# Patient Record
Sex: FEMALE | Race: OTHER | NOT HISPANIC OR LATINO
[De-identification: names, ages, dates, MRNs, and addresses within clinical notes are randomized per-mention and may not be internally consistent; named-entity substitution may affect disease eponyms.]

---

## 2019-10-23 PROBLEM — Z00.00 ENCOUNTER FOR PREVENTIVE HEALTH EXAMINATION: Status: ACTIVE | Noted: 2019-10-23

## 2019-11-04 ENCOUNTER — APPOINTMENT (OUTPATIENT)
Dept: VASCULAR SURGERY | Facility: CLINIC | Age: 74
End: 2019-11-04
Payer: MEDICARE

## 2019-11-04 VITALS
OXYGEN SATURATION: 99 % | BODY MASS INDEX: 34.93 KG/M2 | HEIGHT: 61 IN | SYSTOLIC BLOOD PRESSURE: 152 MMHG | HEART RATE: 66 BPM | DIASTOLIC BLOOD PRESSURE: 79 MMHG | WEIGHT: 185 LBS

## 2019-11-04 DIAGNOSIS — Z78.9 OTHER SPECIFIED HEALTH STATUS: ICD-10-CM

## 2019-11-04 DIAGNOSIS — Z86.79 PERSONAL HISTORY OF OTHER DISEASES OF THE CIRCULATORY SYSTEM: ICD-10-CM

## 2019-11-04 DIAGNOSIS — L97.329 NON-PRESSURE CHRONIC ULCER OF LEFT ANKLE WITH UNSPECIFIED SEVERITY: ICD-10-CM

## 2019-11-04 DIAGNOSIS — Z86.39 PERSONAL HISTORY OF OTHER ENDOCRINE, NUTRITIONAL AND METABOLIC DISEASE: ICD-10-CM

## 2019-11-04 PROCEDURE — 29580 STRAPPING UNNA BOOT: CPT | Mod: LT

## 2019-11-04 PROCEDURE — 93971 EXTREMITY STUDY: CPT

## 2019-11-04 PROCEDURE — 99203 OFFICE O/P NEW LOW 30 MIN: CPT | Mod: 25

## 2019-11-06 NOTE — PHYSICAL EXAM
[2+] : left 2+ [1+] : left 1+ [Ankle Swelling (On Exam)] : present [Ankle Swelling On The Left] : of the left ankle [Ankle Swelling On The Right] : mild [] : of the left leg [Alert] : alert [Calm] : calm [Varicose Veins Of Lower Extremities] : not present [Abdomen Tenderness] : ~T ~M No abdominal tenderness [de-identified] : WN/WD, NAD [de-identified] : NC/AT [de-identified] : supple [de-identified] : +FROM [de-identified] : L medial malleolus: + hyperpigmentation, + small dry ulcerations, no signs of infection, no drainage

## 2019-11-06 NOTE — REVIEW OF SYSTEMS
[As Noted in HPI] : as noted in HPI [Skin Lesions] : skin lesion [Negative] : Heme/Lymph [Fever] : no fever [Chills] : no chills [Leg Claudication] : no intermittent leg claudication

## 2019-11-06 NOTE — ASSESSMENT
[Ulcer Care] : ulcer care [FreeTextEntry1] : 73 yo F comes for initial evaluation of left ankle ulcer that hasn't been healing for 3 years.\par Left medial malleolus venous stasis ulcer with no signs of infection.\par Venous doppler was done in the office demonstrated small reflux in SSV.\par Patient was recommended to have unna boot.\par Unna boot was strapped.\par Pt will return next week for wound check and unna boot change.

## 2019-11-06 NOTE — HISTORY OF PRESENT ILLNESS
[FreeTextEntry1] : 75 yo F comes for initial evaluation of left ankle ulcer that hasn't been healing for 3 years. Patient has been following up at [facility], where she was recommended to apply topical ointment. She denies claudication, hx of DVT, varicose veins. She denies drainage from the ulcer, fever, chills.

## 2019-11-11 ENCOUNTER — APPOINTMENT (OUTPATIENT)
Dept: VASCULAR SURGERY | Facility: CLINIC | Age: 74
End: 2019-11-11
Payer: MEDICARE

## 2019-11-11 PROCEDURE — 99213 OFFICE O/P EST LOW 20 MIN: CPT | Mod: 25

## 2019-11-11 PROCEDURE — 29580 STRAPPING UNNA BOOT: CPT | Mod: LT

## 2019-11-11 NOTE — HISTORY OF PRESENT ILLNESS
[FreeTextEntry1] : 75 yo F with LLE venous stasis ulcer who was seen for the first time last week and had unna boot strapped over her leg returns for a f/u. Patient denies pain, states that her swelling is much less and her ulcer is healing nicely. No fever, chills.

## 2019-11-11 NOTE — REVIEW OF SYSTEMS
[Lower Ext Edema] : lower extremity edema [Limb Swelling] : limb swelling [As Noted in HPI] : as noted in HPI [Skin Lesions] : skin lesion [Negative] : Heme/Lymph [Fever] : no fever [Leg Claudication] : no intermittent leg claudication [Limb Pain] : no limb pain [Chills] : no chills

## 2019-11-11 NOTE — PHYSICAL EXAM
[2+] : left 2+ [1+] : left 1+ [Ankle Swelling (On Exam)] : present [Ankle Swelling On The Right] : mild [Ankle Swelling On The Left] : of the left ankle [] : of the left leg [Alert] : alert [Calm] : calm [Varicose Veins Of Lower Extremities] : not present [Abdomen Tenderness] : ~T ~M No abdominal tenderness [de-identified] : WN/WD, NAD [de-identified] : NC/AT [de-identified] : supple [de-identified] : +FROM [de-identified] : L medial malleolus: + hyperpigmentation, + small dry ulceration, no signs of infection, no drainage

## 2019-11-11 NOTE — ASSESSMENT
[Ulcer Care] : ulcer care [FreeTextEntry1] : 75 yo F with LLE venous stasis ulcer who was seen for the first time last week and had unna boot strapped over her leg returns for a f/u. \par Ulcer is almost healed.\par Unna boot was strapped.\par Patient was given a referral to see a podiatrist for pain in her toe due to ingrown nail.\par F/u prn.

## 2020-02-10 ENCOUNTER — APPOINTMENT (OUTPATIENT)
Dept: VASCULAR SURGERY | Facility: CLINIC | Age: 75
End: 2020-02-10

## 2020-02-24 ENCOUNTER — APPOINTMENT (OUTPATIENT)
Dept: VASCULAR SURGERY | Facility: CLINIC | Age: 75
End: 2020-02-24
Payer: MEDICARE

## 2020-02-24 PROCEDURE — 29580 STRAPPING UNNA BOOT: CPT | Mod: LT

## 2020-02-24 PROCEDURE — 99212 OFFICE O/P EST SF 10 MIN: CPT | Mod: 25

## 2020-02-24 RX ORDER — FLUOCINONIDE 0.5 MG/G
0.05 CREAM TOPICAL TWICE DAILY
Qty: 30 | Refills: 0 | Status: ACTIVE | COMMUNITY
Start: 2020-02-24 | End: 1900-01-01

## 2020-02-25 NOTE — PHYSICAL EXAM
[2+] : right 2+ [1+] : left 1+ [Ankle Swelling (On Exam)] : present [Ankle Swelling On The Left] : of the left ankle [Ankle Swelling On The Right] : mild [] : of the left leg [Alert] : alert [Calm] : calm [Varicose Veins Of Lower Extremities] : not present [Abdomen Tenderness] : ~T ~M No abdominal tenderness [de-identified] : NC/AT [de-identified] : WN/WD, NAD [de-identified] : supple [de-identified] : +FROM [de-identified] : L medial malleolus: + hyperpigmentation, + small dry ulceration, no signs of infection, no drainage

## 2020-02-25 NOTE — HISTORY OF PRESENT ILLNESS
[FreeTextEntry1] : 75 y/o F with PMHx of HLD, HTN presents today for a follow-up on LLE venous stasis ulcer. Patient denies pain, states that her swelling is much less and her ulcer is healing nicely. No fever, chills.

## 2020-02-25 NOTE — ASSESSMENT
[Ulcer Care] : ulcer care [FreeTextEntry1] : 73 y/o F with LLE venous stasis ulcer. The ulcer is healing well\par

## 2020-02-25 NOTE — REVIEW OF SYSTEMS
[Lower Ext Edema] : lower extremity edema [Limb Swelling] : limb swelling [As Noted in HPI] : as noted in HPI [Skin Lesions] : skin lesion [Negative] : Heme/Lymph [Chills] : no chills [Fever] : no fever [Leg Claudication] : no intermittent leg claudication [Limb Pain] : no limb pain

## 2020-07-06 ENCOUNTER — APPOINTMENT (OUTPATIENT)
Dept: VASCULAR SURGERY | Facility: CLINIC | Age: 75
End: 2020-07-06

## 2020-09-07 ENCOUNTER — EMERGENCY (EMERGENCY)
Facility: HOSPITAL | Age: 75
LOS: 1 days | Discharge: HOME | End: 2020-09-07
Attending: EMERGENCY MEDICINE | Admitting: EMERGENCY MEDICINE
Payer: MEDICARE

## 2020-09-07 VITALS
HEART RATE: 88 BPM | HEIGHT: 64 IN | TEMPERATURE: 98 F | DIASTOLIC BLOOD PRESSURE: 63 MMHG | SYSTOLIC BLOOD PRESSURE: 140 MMHG | RESPIRATION RATE: 18 BRPM | WEIGHT: 175.05 LBS

## 2020-09-07 LAB
ALBUMIN SERPL ELPH-MCNC: 4.1 G/DL — SIGNIFICANT CHANGE UP (ref 3.3–5)
ALP SERPL-CCNC: 77 U/L — SIGNIFICANT CHANGE UP (ref 40–120)
ALT FLD-CCNC: 8 U/L — LOW (ref 10–45)
ANION GAP SERPL CALC-SCNC: 13 MMOL/L — SIGNIFICANT CHANGE UP (ref 5–17)
APTT BLD: 33.4 SEC — SIGNIFICANT CHANGE UP (ref 27.5–35.5)
AST SERPL-CCNC: 12 U/L — SIGNIFICANT CHANGE UP (ref 10–40)
BASOPHILS # BLD AUTO: 0.01 K/UL — SIGNIFICANT CHANGE UP (ref 0–0.2)
BASOPHILS NFR BLD AUTO: 0.1 % — SIGNIFICANT CHANGE UP (ref 0–2)
BILIRUB SERPL-MCNC: 0.3 MG/DL — SIGNIFICANT CHANGE UP (ref 0.2–1.2)
BUN SERPL-MCNC: 23 MG/DL — SIGNIFICANT CHANGE UP (ref 7–23)
CALCIUM SERPL-MCNC: 9.9 MG/DL — SIGNIFICANT CHANGE UP (ref 8.4–10.5)
CHLORIDE SERPL-SCNC: 102 MMOL/L — SIGNIFICANT CHANGE UP (ref 96–108)
CO2 SERPL-SCNC: 25 MMOL/L — SIGNIFICANT CHANGE UP (ref 22–31)
CREAT SERPL-MCNC: 0.63 MG/DL — SIGNIFICANT CHANGE UP (ref 0.5–1.3)
EOSINOPHIL # BLD AUTO: 0.05 K/UL — SIGNIFICANT CHANGE UP (ref 0–0.5)
EOSINOPHIL NFR BLD AUTO: 0.6 % — SIGNIFICANT CHANGE UP (ref 0–6)
GLUCOSE SERPL-MCNC: 138 MG/DL — HIGH (ref 70–99)
HCT VFR BLD CALC: 36.9 % — SIGNIFICANT CHANGE UP (ref 34.5–45)
HGB BLD-MCNC: 12 G/DL — SIGNIFICANT CHANGE UP (ref 11.5–15.5)
IMM GRANULOCYTES NFR BLD AUTO: 0.4 % — SIGNIFICANT CHANGE UP (ref 0–1.5)
INR BLD: 1.23 — HIGH (ref 0.88–1.16)
LYMPHOCYTES # BLD AUTO: 1.71 K/UL — SIGNIFICANT CHANGE UP (ref 1–3.3)
LYMPHOCYTES # BLD AUTO: 22 % — SIGNIFICANT CHANGE UP (ref 13–44)
MCHC RBC-ENTMCNC: 30.1 PG — SIGNIFICANT CHANGE UP (ref 27–34)
MCHC RBC-ENTMCNC: 32.5 GM/DL — SIGNIFICANT CHANGE UP (ref 32–36)
MCV RBC AUTO: 92.5 FL — SIGNIFICANT CHANGE UP (ref 80–100)
MONOCYTES # BLD AUTO: 0.63 K/UL — SIGNIFICANT CHANGE UP (ref 0–0.9)
MONOCYTES NFR BLD AUTO: 8.1 % — SIGNIFICANT CHANGE UP (ref 2–14)
NEUTROPHILS # BLD AUTO: 5.35 K/UL — SIGNIFICANT CHANGE UP (ref 1.8–7.4)
NEUTROPHILS NFR BLD AUTO: 68.8 % — SIGNIFICANT CHANGE UP (ref 43–77)
NRBC # BLD: 0 /100 WBCS — SIGNIFICANT CHANGE UP (ref 0–0)
PLATELET # BLD AUTO: 218 K/UL — SIGNIFICANT CHANGE UP (ref 150–400)
POTASSIUM SERPL-MCNC: 4 MMOL/L — SIGNIFICANT CHANGE UP (ref 3.5–5.3)
POTASSIUM SERPL-SCNC: 4 MMOL/L — SIGNIFICANT CHANGE UP (ref 3.5–5.3)
PROT SERPL-MCNC: 7.4 G/DL — SIGNIFICANT CHANGE UP (ref 6–8.3)
PROTHROM AB SERPL-ACNC: 14.6 SEC — HIGH (ref 10.6–13.6)
RBC # BLD: 3.99 M/UL — SIGNIFICANT CHANGE UP (ref 3.8–5.2)
RBC # FLD: 13.4 % — SIGNIFICANT CHANGE UP (ref 10.3–14.5)
SODIUM SERPL-SCNC: 140 MMOL/L — SIGNIFICANT CHANGE UP (ref 135–145)
WBC # BLD: 7.78 K/UL — SIGNIFICANT CHANGE UP (ref 3.8–10.5)
WBC # FLD AUTO: 7.78 K/UL — SIGNIFICANT CHANGE UP (ref 3.8–10.5)

## 2020-09-07 PROCEDURE — 85730 THROMBOPLASTIN TIME PARTIAL: CPT

## 2020-09-07 PROCEDURE — 99283 EMERGENCY DEPT VISIT LOW MDM: CPT

## 2020-09-07 PROCEDURE — 36415 COLL VENOUS BLD VENIPUNCTURE: CPT

## 2020-09-07 PROCEDURE — 85610 PROTHROMBIN TIME: CPT

## 2020-09-07 PROCEDURE — 85025 COMPLETE CBC W/AUTO DIFF WBC: CPT

## 2020-09-07 PROCEDURE — 80053 COMPREHEN METABOLIC PANEL: CPT

## 2020-09-07 PROCEDURE — 99284 EMERGENCY DEPT VISIT MOD MDM: CPT

## 2020-09-07 RX ORDER — IBUPROFEN 200 MG
600 TABLET ORAL ONCE
Refills: 0 | Status: COMPLETED | OUTPATIENT
Start: 2020-09-07 | End: 2020-09-07

## 2020-09-07 RX ORDER — ACETAMINOPHEN 500 MG
1000 TABLET ORAL ONCE
Refills: 0 | Status: COMPLETED | OUTPATIENT
Start: 2020-09-07 | End: 2020-09-07

## 2020-09-07 RX ADMIN — Medication 600 MILLIGRAM(S): at 16:44

## 2020-09-07 RX ADMIN — Medication 600 MILLIGRAM(S): at 17:30

## 2020-09-07 NOTE — ED PROVIDER NOTE - CARE PROVIDERS DIRECT ADDRESSES
[email or web address]

## 2020-09-07 NOTE — ED PROVIDER NOTE - OBJECTIVE STATEMENT
The pt is a 76 y/o F, who presents to ED c/o ulcer to L ankle x 4-5 yrs. Sees vascular at [facility], gets hany boots, just had one taken off -- here wanting to know why the ulcer hasn't fully healed, admits that ulcer heals but then comes back, no acute or worsening symptoms, no increase in size of ulcer. Reports "pulling" sensation around ulcer site. Denies fevers, chills, calf pain, injury, decreased rom, pus, bleeding

## 2020-09-07 NOTE — ED PROVIDER NOTE - PATIENT PORTAL LINK FT
You can access the FollowMyHealth Patient Portal offered by [facility] by registering at the following website: [email or web address]. By joining [employer]’s FollowMyHealth portal, you will also be able to view your health information using other applications (apps) compatible with our system.

## 2020-09-07 NOTE — ED PROVIDER NOTE - MUSCULOSKELETAL, MLM
Spine appears normal, range of motion is not limited, no muscle or joint tenderness; LE: + venous stasis changes b/l - L>R, + 5mm clean ulcer to l medial malleolus w/clear dc, no pus, no bleeding, no warmth, no tend, no ascending lymphangitis, pedal pulses 2+, no calf tend b/l, soft compartments b/l, + light touch b/l

## 2020-09-07 NOTE — ED ADULT NURSE NOTE - OBJECTIVE STATEMENT
Pt came to ED complaining of left lower leg extremity pain. Pt presents with diabetic leg ulcer and discoloration on LLE. Pt has approx 1.5cm x 1cm ulcer and 10cm x 8cm area of discoloration to left medial lower leg. Pt denies fever, chills, D/N/V, SOB, chest pain, /GI symptoms. Pt states she is under care of MD for wound care but states pain has increased.

## 2020-09-07 NOTE — ED PROVIDER NOTE - CARE PROVIDER_API CALL
Jill Adams)  [facility] Surgery  Vascular  [address] [place] [address]  Phone: [phone]  Fax: [phone]  Follow Up Time:     Gary Frye  SURGERY  [address] [place] [address]  Phone: [phone]  Fax: [phone]  Follow Up Time:

## 2020-09-07 NOTE — ED PROVIDER NOTE - ATTENDING CONTRIBUTION TO CARE
75F pmh venous stasis ulcer to L ankle for years, seen at [facility] specialist, w/ hany boot, seen recently at their clinic but came here today because not fully resolved. No f/c. Exam nontoxic, chronic appearing medial venous stasis ulcer, not acute/severely infected , no discharge/foul smell. Doubt sepsis, cellulitis, not consistent w/ nec fasc

## 2020-09-07 NOTE — ED PROVIDER NOTE - NSFOLLOWUPINSTRUCTIONS_ED_ALL_ED_FT
follow up with vascular for evaluation and treatment of your chronic ulcer  Stasis Dermatitis  WHAT YOU NEED TO KNOW:  Stasis dermatitis is a condition that develops when blood pools in your lower legs. It is caused by poor blood flow back to your heart.  DISCHARGE INSTRUCTIONS:  Call 911 for any of the following:   You feel lightheaded, short of breath, and have chest pain.  You cough up blood.  Return to the emergency department if:   Your leg feels warm, tender, and painful. It may look swollen and red.  Contact your healthcare provider if:   You have a fever.  Your pain is not getting better, even with treatment.  You have new or worse open sores.  Your sores are draining pus.  Your movement is limited.  You have questions or concerns about your condition or care.  Medicines:   Medicines help improve blood flow from your legs to your heart and decrease swelling.  Take your medicine as directed. Contact your healthcare provider if you think your medicine is not helping or if you have side effects. Tell him of her if you are allergic to any medicine. Keep a list of the medicines, vitamins, and herbs you take. Include the amounts, and when and why you take them. Bring the list or the pill bottles to follow-up visits. Carry your medicine list with you in case of an emergency  Manage your symptoms:   Wear pressure stockings. These tight elastic stockings put pressure on your legs. This improves blood flow and prevents blood from collecting in your legs.Pressure Stockings   Elevate your legs above the level of your heart for 30 minutes, 4 times a day. This will help decrease swelling and pain. Prop your legs on pillows or blankets to keep them elevated comfortably.  Apply unscented lotions or creams to help keep your skin moist and decrease itching.  Do not scratch your legs. Your skin can break open if you scratch. This can lead to sores or an infection.  Lifestyle changes:   Maintain a healthy weight. Ask your healthcare provider how much you should weigh. Ask him to help you create a weight loss plan if you are overweight. This will help improve your blood flow.  Eat a variety of healthy foods. Healthy foods include fruits, vegetables, whole-grain breads, low-fat dairy products, beans, lean meats, and fish. You may need to eat foods that are low in salt to help decrease swelling in your legs.  Exercise regularly. Ask your healthcare provider about the best exercise plan for you. Exercise improves the blood flow in your legs.  Chronic Wounds  WHAT YOU NEED TO KNOW:  A chronic wound is a wound that does not heal completely in 6 weeks. A wound is an injury that causes a break in the skin. There may also be damage to nearby tissues. Examples of wounds that can become chronic are deep ulcers (open sores), large burns, and infected cuts.  DISCHARGE INSTRUCTIONS:  Contact your healthcare provider if:   You have a fever.   You have increased or new pain, swelling, redness, or bleeding in or around your wound.  You have pus or a foul odor coming from your wound.  Your skin itches or has a rash.  You have open sores, blisters, or changes in the color or temperature of your skin.   You have questions or concerns about your condition or care.   Medicines:   NSAIDs, such as ibuprofen, help decrease swelling, pain, and fever. This medicine is available with or without a doctor's order. NSAIDs can cause stomach bleeding or kidney problems in certain people. If you take blood thinner medicine, always ask if NSAIDs are safe for you. Always read the medicine label and follow directions. Do not give these medicines to children under 6 months of age without direction from your child's healthcare provider.  Acetaminophen decreases pain and fever. It is available without a doctor's order. Ask how much to take and how often to take it. Follow directions. Read the labels of all other medicines you are using to see if they also contain acetaminophen, or ask your doctor or pharmacist. Acetaminophen can cause liver damage if not taken correctly. Do not use more than 4 grams (4,000 milligrams) total of acetaminophen in one day.   Antibiotics may be given to prevent or treat an infection caused by bacteria.  Take your medicine as directed. Contact your healthcare provider if you think your medicine is not helping or if you have side effects. Tell him or her if you are allergic to any medicine. Keep a list of the medicines, vitamins, and herbs you take. Include the amounts, and when and why you take them. Bring the list or the pill bottles to follow-up visits. Carry your medicine list with you in case of an emergency.  Follow up with your healthcare provider as directed: You need to return to have your wound checked. You may also need to have the bandage changed. Write down your questions so you remember to ask them during your visits.  What you need to know about wound care:   Wash your hands before and after you take care of your wound.  Keep the bandage clean and dry. Do not stop using the bandage on your wound unless your healthcare provider says it is okay.  Clean the wound and change the dressing as often as directed by your healthcare provider.   Eat healthy foods and drink liquids as directed: Healthy foods give your body the nutrients it needs to heal your wound. Liquids prevent dehydration that can decrease the blood supply to your wound. Healthy foods include fruits, vegetables, grains (breads and cereals), dairy, and protein foods. Protein foods include meat, fish, nuts, and soy products. Protein, calories, vitamin C, and zinc help wounds heal. Ask for more information about the foods you should eat to improve healing.   Do not smoke: If you smoke, it is never too late to quit. Smoking delays wound healing. Smoking also increases your risk for infection after surgery. Ask your healthcare provider for information if you need help quitting.  Prevent pressure wounds: Pressure wounds can develop when blood flow to an area is blocked. For example, you sit or lie in the same position without moving and put pressure on your heels. You can prevent pressure wounds by doing any of the following:  Change your position every 15 minutes while you are sitting.   Change your position every 2 hours while you lie in bed.  Prop your legs on pillows to lift your heels while you are lying down.  Check your skin or have someone else check your skin daily. Check the areas that are common to pressure wounds, such as elbows, heels, and buttocks. Common early signs of pressure wounds are open sores, blisters, or changes in color or temperature.

## 2020-09-07 NOTE — ED PROVIDER NOTE - CLINICAL SUMMARY MEDICAL DECISION MAKING FREE TEXT BOX
pt w/venous stasis changes and chronic ulcer - being tx'd w/hany boots at another hosp, presents to [facility] wanting to know why ulcer keeps coming back and not healing full, does not appear infected - no acute changes in any symptoms, no fevers, chills, pus/bleeding, labs , advised to f/u w/vascular for hany boot and further care. wound dressed. pt w/venous stasis changes and chronic ulcer - being tx'd w/hany boots at another hosp, presents to [facility] wanting to know why ulcer keeps coming back and not healing full, does not appear infected - no acute changes in any symptoms, no fevers, chills, pus/bleeding, labs wnl, advised to f/u w/vascular for hany boot and further care. wound dressed.

## 2020-09-11 DIAGNOSIS — Z88.1 ALLERGY STATUS TO OTHER ANTIBIOTIC AGENTS STATUS: ICD-10-CM

## 2020-09-11 DIAGNOSIS — M79.673 PAIN IN UNSPECIFIED FOOT: ICD-10-CM

## 2020-09-11 DIAGNOSIS — I83.003 VARICOSE VEINS OF UNSPECIFIED LOWER EXTREMITY WITH ULCER OF ANKLE: ICD-10-CM

## 2020-09-11 DIAGNOSIS — I87.2 VENOUS INSUFFICIENCY (CHRONIC) (PERIPHERAL): ICD-10-CM

## 2020-09-11 DIAGNOSIS — Z88.8 ALLERGY STATUS TO OTHER DRUGS, MEDICAMENTS AND BIOLOGICAL SUBSTANCES: ICD-10-CM

## 2020-09-18 ENCOUNTER — APPOINTMENT (OUTPATIENT)
Dept: VASCULAR SURGERY | Facility: CLINIC | Age: 75
End: 2020-09-18
Payer: MEDICARE

## 2020-09-18 PROCEDURE — 29580 STRAPPING UNNA BOOT: CPT | Mod: LT

## 2020-09-18 PROCEDURE — 99213 OFFICE O/P EST LOW 20 MIN: CPT | Mod: 25

## 2020-09-18 PROCEDURE — 93971 EXTREMITY STUDY: CPT

## 2020-09-18 RX ORDER — SILVER SULFADIAZINE 10 MG/G
1 CREAM TOPICAL DAILY
Qty: 30 | Refills: 1 | Status: ACTIVE | COMMUNITY
Start: 2020-09-18 | End: 1900-01-01

## 2020-09-18 RX ORDER — HYDROCODONE BITARTRATE AND ACETAMINOPHEN 5; 325 MG/1; MG/1
5-325 TABLET ORAL
Qty: 20 | Refills: 0 | Status: ACTIVE | COMMUNITY
Start: 2020-09-18 | End: 1900-01-01

## 2020-09-18 NOTE — ASSESSMENT
[FreeTextEntry1] : 74 y/o F with LLE venous stasis ulcer. Upon examination with healing LLE ulcer with no active drainage, surrounding erythema or signs of infection. LLE doppler done at the office today demonstrates: Negative DVT, SSV reflux but small. LLE Wound cleaned with hydrogen peroxide, A&D ointment applied. Wrapped with Kerlix,  ACE bandage and Unna boot.Patient educated on wound care, will set up VNS at home. She will follow up with us in 4 weeks or sooner if necessary.

## 2020-09-18 NOTE — PROCEDURE
[FreeTextEntry1] : LLE doppler done at the office today demonstrates: Negative DVT, SSV reflux but small. \par LLE Wound cleaned with hydrogen peroxide, A&D ointment applied. Wrapped with Kerlix,  ACE bandage and Unna boot.

## 2020-09-18 NOTE — PHYSICAL EXAM
[2+] : left 2+ [1+] : left 1+ [Ankle Swelling (On Exam)] : present [Ankle Swelling On The Left] : of the left ankle [Ankle Swelling On The Right] : mild [] : of the left leg [Alert] : alert [Oriented to Person] : oriented to person [Oriented to Place] : oriented to place [Oriented to Time] : oriented to time [Calm] : calm [Varicose Veins Of Lower Extremities] : not present [Abdomen Tenderness] : ~T ~M No abdominal tenderness [de-identified] : WN/WD, NAD [de-identified] : NC/AT [de-identified] : supple [de-identified] : +FROM [de-identified] : L medial malleolus: + hyperpigmentation, + small dry ulceration, no signs of infection, no drainage

## 2020-09-18 NOTE — HISTORY OF PRESENT ILLNESS
[FreeTextEntry1] : 74 y/o F with PMHx of HLD, HTN presents today for a follow-up on LLE venous stasis ulcer. Patient reports her swelling is much less and her ulcer is healing nicely but still c/o mild pain . Denies rest pain, fever, chills.\par \par She is accompanied today by a family member.

## 2020-09-18 NOTE — REVIEW OF SYSTEMS
[Lower Ext Edema] : lower extremity edema [Limb Swelling] : limb swelling [As Noted in HPI] : as noted in HPI [Skin Lesions] : skin lesion [Negative] : Heme/Lymph [Fever] : no fever [Chills] : no chills [Leg Claudication] : no intermittent leg claudication [Limb Pain] : no limb pain

## 2020-09-18 NOTE — ADDENDUM
[FreeTextEntry1] : This note was written by Abbi Jewell on 09/18/2020 acting as scribe for Miguelangel Reno M.D.\par \par I, Miguelangel Reno have read and attest that all the information, medical decision making and discharge instructions within are true and accurate.

## 2020-10-19 ENCOUNTER — APPOINTMENT (OUTPATIENT)
Dept: VASCULAR SURGERY | Facility: CLINIC | Age: 75
End: 2020-10-19
Payer: MEDICARE

## 2020-10-19 PROCEDURE — 29580 STRAPPING UNNA BOOT: CPT | Mod: LT

## 2020-10-19 PROCEDURE — 99212 OFFICE O/P EST SF 10 MIN: CPT | Mod: 25

## 2020-10-22 NOTE — ADDENDUM
[FreeTextEntry1] : This note was written by Abbi Jewell on 10/19/2020 acting as scribe for Gary Benjamin M.D.\par \par I, Gary Reno have read and attest that all the information, medical decision making and discharge instructions within are true and accurate.

## 2020-10-22 NOTE — ASSESSMENT
[FreeTextEntry1] : 74 y/o F with LLE venous stasis ulcer. On exam, L medial malleolus: + hyperpigmentation c/w venous stasis + small dry ulceration, no signs of infection, no drainage. Feet are pink, toes are warm to touch with adequate capillary refill. \par LLE Wound cleaned with hydrogen peroxide, A&D ointment applied. Wrapped with Kerlix,  ACE bandage and Unna boot.\par Patient educated on wound care,  She will follow up with us in 4 weeks or sooner if necessary.  [Arterial/Venous Disease] : arterial/venous disease [Foot care/Footwear] : foot care/footwear [Ulcer Care] : ulcer care

## 2020-10-22 NOTE — REVIEW OF SYSTEMS
[Lower Ext Edema] : lower extremity edema [Limb Swelling] : limb swelling [Skin Lesions] : skin lesion [As Noted in HPI] : as noted in HPI [Negative] : Heme/Lymph [Fever] : no fever [Chills] : no chills [Leg Claudication] : no intermittent leg claudication [Limb Pain] : no limb pain

## 2020-10-22 NOTE — PHYSICAL EXAM
[Respiratory Effort] : normal respiratory effort [Normal Rate and Rhythm] : normal rate and rhythm [2+] : left 2+ [1+] : left 1+ [Ankle Swelling (On Exam)] : present [Ankle Swelling On The Left] : of the left ankle [Ankle Swelling On The Right] : mild [] : of the left leg [Alert] : alert [Oriented to Person] : oriented to person [Oriented to Place] : oriented to place [Oriented to Time] : oriented to time [Calm] : calm [Varicose Veins Of Lower Extremities] : not present [Abdomen Tenderness] : ~T ~M No abdominal tenderness [de-identified] : Calm, cooperative [de-identified] : NC/AT [de-identified] : supple [de-identified] : +FROM [de-identified] : L medial malleolus: + hyperpigmentation c/w venous stasis + small dry ulceration, no signs of infection, no drainage. Feet are pink, toes are warm to touch with adequate capillary refill.

## 2020-10-22 NOTE — PROCEDURE
[FreeTextEntry1] : LLE Wound cleaned with hydrogen peroxide, A&D ointment applied. Wrapped with Kerlix,  ACE bandage and Unna boot.

## 2020-10-22 NOTE — HISTORY OF PRESENT ILLNESS
[FreeTextEntry1] : 76 y/o F with PMHx of HLD, HTN presents today for a follow-up on LLE venous stasis ulcer. Patient reports her swelling is much less and her ulcer is healing nicely but she does continue to experience LLE pain and notice wound has been slowly healing. She has been receiving wound care at home with VNS weekly . Denies rest pain, fever, chills.\par \par She is accompanied today by a family member.

## 2021-01-04 ENCOUNTER — APPOINTMENT (OUTPATIENT)
Dept: VASCULAR SURGERY | Facility: CLINIC | Age: 76
End: 2021-01-04

## 2021-03-01 ENCOUNTER — APPOINTMENT (OUTPATIENT)
Dept: VASCULAR SURGERY | Facility: CLINIC | Age: 76
End: 2021-03-01

## 2021-03-15 ENCOUNTER — APPOINTMENT (OUTPATIENT)
Dept: VASCULAR SURGERY | Facility: CLINIC | Age: 76
End: 2021-03-15
Payer: MEDICARE

## 2021-03-15 PROCEDURE — 99213 OFFICE O/P EST LOW 20 MIN: CPT

## 2021-03-15 PROCEDURE — 99072 ADDL SUPL MATRL&STAF TM PHE: CPT

## 2021-03-15 RX ORDER — SILVER SULFADIAZINE 10 MG/G
1 CREAM TOPICAL DAILY
Qty: 1 | Refills: 1 | Status: ACTIVE | COMMUNITY
Start: 2021-03-15 | End: 1900-01-01

## 2021-03-16 NOTE — ASSESSMENT
[FreeTextEntry1] : 74 y/o F with LLE venous stasis ulcer. On exam, L anteromedial malleolus: + hyperpigmentation c/w venous stasis, small open sore to the proximal ankle with no active drainage ~ 1 cm + small dry ulceration with very dry skin and scabs , no drainage. Feet are pink, toes are warm to touch with adequate capillary refill. \par \par LLE wound debrided. Patient tolerated well. \par \par Plan:\par Shower daily with soap and abundant water. Dry skin well overall and apply Silvadene over wounds which I have prescribed to her pharmacy. Patient and HHA instructed to avoid applying over entire leg as it will irritate skin. Continue to moisturize skin daily. Patient will f/u here Friday for unna boot placement. \par \par  [Arterial/Venous Disease] : arterial/venous disease [Ulcer Care] : ulcer care

## 2021-03-16 NOTE — ADDENDUM
[FreeTextEntry1] : This note was written by Abbi Jewell on 03/15/2021 acting as scribe for Gary Benjamin M.D.\par \par I, Gary Reno have read and attest that all the information, medical decision making and discharge instructions within are true and accurate.

## 2021-03-16 NOTE — HISTORY OF PRESENT ILLNESS
[FreeTextEntry1] : 74 y/o F with PMH of HLD, HTN presents today for a follow-up on LLE venous stasis ulcer. Patient reports her swelling is much less and her ulcer is slowly healing but she does continue to experience LLE pain most at night. She notes the wound is healing very slowly. She has been receiving wound care at home with VNS weekly. Denies rest pain, fever, chills.\par \par She is accompanied today by a HHA.

## 2021-03-16 NOTE — PHYSICAL EXAM
[Respiratory Effort] : normal respiratory effort [Normal Rate and Rhythm] : normal rate and rhythm [2+] : left 2+ [1+] : left 1+ [Ankle Swelling (On Exam)] : present [Ankle Swelling On The Left] : of the left ankle [Ankle Swelling On The Right] : mild [] : of the left leg [Alert] : alert [Oriented to Person] : oriented to person [Oriented to Place] : oriented to place [Oriented to Time] : oriented to time [Calm] : calm [Varicose Veins Of Lower Extremities] : not present [Abdomen Tenderness] : ~T ~M No abdominal tenderness [de-identified] : Calm, cooperative [de-identified] : NC/AT [de-identified] : supple [de-identified] : +FROM [de-identified] : L caesar-medial malleolus: + hyperpigmentation c/w venous stasis, small open sore to the proximal ankle with no active drainage ~ 1 cm + small dry ulceration with very dry skin and scabs , no drainage. Feet are pink, toes are warm to touch with adequate capillary refill.

## 2021-03-19 ENCOUNTER — APPOINTMENT (OUTPATIENT)
Dept: VASCULAR SURGERY | Facility: CLINIC | Age: 76
End: 2021-03-19

## 2021-03-19 NOTE — REVIEW OF SYSTEMS
[Fever] : no fever [Chills] : no chills [Leg Claudication] : no intermittent leg claudication [Lower Ext Edema] : lower extremity edema [Limb Pain] : no limb pain [Limb Swelling] : limb swelling [As Noted in HPI] : as noted in HPI [Skin Lesions] : skin lesion [Negative] : Heme/Lymph

## 2021-03-19 NOTE — ASSESSMENT
[FreeTextEntry1] : 76 y/o F with LLE venous stasis ulcer. On exam, L anteromedial malleolus: + hyperpigmentation c/w venous stasis, small open sore to the proximal ankle with no active drainage ~ 1 cm + small dry ulceration with very dry skin and scabs , no drainage. Feet are pink, toes are warm to touch with adequate capillary refill. \par \par  Patient will f/u here Friday for unna boot placement. \par \par  [Arterial/Venous Disease] : arterial/venous disease [Ulcer Care] : ulcer care

## 2021-03-19 NOTE — HISTORY OF PRESENT ILLNESS
[FreeTextEntry1] : 74 y/o F with PMH of HLD, HTN presents today for a follow-up on LLE venous stasis ulcer. Patient reports her swelling is much less and her ulcer is slowly healing but she does continue to experience LLE pain most at night. She notes the wound is healing very slowly. Denies rest pain, fever, chills.\par \par She is accompanied today by a HHA.

## 2021-03-19 NOTE — PHYSICAL EXAM
[Respiratory Effort] : normal respiratory effort [Normal Rate and Rhythm] : normal rate and rhythm [2+] : left 2+ [1+] : left 1+ [Ankle Swelling (On Exam)] : present [Ankle Swelling On The Left] : of the left ankle [Ankle Swelling On The Right] : mild [Varicose Veins Of Lower Extremities] : not present [] : of the left leg [Abdomen Tenderness] : ~T ~M No abdominal tenderness [Alert] : alert [Oriented to Person] : oriented to person [Oriented to Place] : oriented to place [Oriented to Time] : oriented to time [Calm] : calm [de-identified] : Calm, cooperative [de-identified] : NC/AT [de-identified] : supple [de-identified] : +FROM [de-identified] : L caesar-medial malleolus: + hyperpigmentation c/w venous stasis, small open sore to the proximal ankle with no active drainage ~ 1 cm + small dry ulceration with very dry skin and scabs , no drainage. Feet are pink, toes are warm to touch with adequate capillary refill.

## 2021-03-29 ENCOUNTER — APPOINTMENT (OUTPATIENT)
Dept: VASCULAR SURGERY | Facility: CLINIC | Age: 76
End: 2021-03-29

## 2021-03-29 ENCOUNTER — EMERGENCY (EMERGENCY)
Facility: HOSPITAL | Age: 76
LOS: 1 days | Discharge: HOME | End: 2021-03-29
Attending: EMERGENCY MEDICINE | Admitting: EMERGENCY MEDICINE
Payer: MEDICARE

## 2021-03-29 VITALS
DIASTOLIC BLOOD PRESSURE: 67 MMHG | SYSTOLIC BLOOD PRESSURE: 152 MMHG | RESPIRATION RATE: 17 BRPM | OXYGEN SATURATION: 96 % | TEMPERATURE: 98 F | HEART RATE: 69 BPM

## 2021-03-29 VITALS
OXYGEN SATURATION: 96 % | HEART RATE: 99 BPM | TEMPERATURE: 98 F | SYSTOLIC BLOOD PRESSURE: 142 MMHG | WEIGHT: 139.99 LBS | RESPIRATION RATE: 16 BRPM | DIASTOLIC BLOOD PRESSURE: 71 MMHG | HEIGHT: 64 IN

## 2021-03-29 LAB
ALBUMIN SERPL ELPH-MCNC: 4.3 G/DL — SIGNIFICANT CHANGE UP (ref 3.3–5)
ALP SERPL-CCNC: 91 U/L — SIGNIFICANT CHANGE UP (ref 40–120)
ALT FLD-CCNC: 9 U/L — LOW (ref 10–45)
ANION GAP SERPL CALC-SCNC: 14 MMOL/L — SIGNIFICANT CHANGE UP (ref 5–17)
APPEARANCE UR: CLEAR — SIGNIFICANT CHANGE UP
AST SERPL-CCNC: 13 U/L — SIGNIFICANT CHANGE UP (ref 10–40)
B-OH-BUTYR SERPL-SCNC: 0.1 MMOL/L — SIGNIFICANT CHANGE UP
BACTERIA # UR AUTO: ABNORMAL /HPF
BASOPHILS # BLD AUTO: 0.03 K/UL — SIGNIFICANT CHANGE UP (ref 0–0.2)
BASOPHILS NFR BLD AUTO: 0.4 % — SIGNIFICANT CHANGE UP (ref 0–2)
BILIRUB SERPL-MCNC: 0.3 MG/DL — SIGNIFICANT CHANGE UP (ref 0.2–1.2)
BILIRUB UR-MCNC: NEGATIVE — SIGNIFICANT CHANGE UP
BUN SERPL-MCNC: 16 MG/DL — SIGNIFICANT CHANGE UP (ref 7–23)
CALCIUM SERPL-MCNC: 10.1 MG/DL — SIGNIFICANT CHANGE UP (ref 8.4–10.5)
CHLORIDE SERPL-SCNC: 97 MMOL/L — SIGNIFICANT CHANGE UP (ref 96–108)
CO2 SERPL-SCNC: 26 MMOL/L — SIGNIFICANT CHANGE UP (ref 22–31)
COLOR SPEC: YELLOW — SIGNIFICANT CHANGE UP
CREAT SERPL-MCNC: 0.66 MG/DL — SIGNIFICANT CHANGE UP (ref 0.5–1.3)
CRP SERPL-MCNC: 7.6 MG/L — HIGH (ref 0–4)
DIFF PNL FLD: NEGATIVE — SIGNIFICANT CHANGE UP
EOSINOPHIL # BLD AUTO: 0.05 K/UL — SIGNIFICANT CHANGE UP (ref 0–0.5)
EOSINOPHIL NFR BLD AUTO: 0.6 % — SIGNIFICANT CHANGE UP (ref 0–6)
EPI CELLS # UR: ABNORMAL /HPF (ref 0–5)
ERYTHROCYTE [SEDIMENTATION RATE] IN BLOOD: 45 MM/HR — HIGH
GLUCOSE SERPL-MCNC: 208 MG/DL — HIGH (ref 70–99)
GLUCOSE UR QL: NEGATIVE — SIGNIFICANT CHANGE UP
HCT VFR BLD CALC: 34.1 % — LOW (ref 34.5–45)
HGB BLD-MCNC: 10.9 G/DL — LOW (ref 11.5–15.5)
IMM GRANULOCYTES NFR BLD AUTO: 0.5 % — SIGNIFICANT CHANGE UP (ref 0–1.5)
KETONES UR-MCNC: NEGATIVE — SIGNIFICANT CHANGE UP
LACTATE SERPL-SCNC: 1.5 MMOL/L — SIGNIFICANT CHANGE UP (ref 0.5–2)
LACTATE SERPL-SCNC: 3.2 MMOL/L — HIGH (ref 0.5–2)
LEUKOCYTE ESTERASE UR-ACNC: NEGATIVE — SIGNIFICANT CHANGE UP
LYMPHOCYTES # BLD AUTO: 2.02 K/UL — SIGNIFICANT CHANGE UP (ref 1–3.3)
LYMPHOCYTES # BLD AUTO: 23.8 % — SIGNIFICANT CHANGE UP (ref 13–44)
MCHC RBC-ENTMCNC: 29.3 PG — SIGNIFICANT CHANGE UP (ref 27–34)
MCHC RBC-ENTMCNC: 32 GM/DL — SIGNIFICANT CHANGE UP (ref 32–36)
MCV RBC AUTO: 91.7 FL — SIGNIFICANT CHANGE UP (ref 80–100)
MONOCYTES # BLD AUTO: 0.68 K/UL — SIGNIFICANT CHANGE UP (ref 0–0.9)
MONOCYTES NFR BLD AUTO: 8 % — SIGNIFICANT CHANGE UP (ref 2–14)
NEUTROPHILS # BLD AUTO: 5.66 K/UL — SIGNIFICANT CHANGE UP (ref 1.8–7.4)
NEUTROPHILS NFR BLD AUTO: 66.7 % — SIGNIFICANT CHANGE UP (ref 43–77)
NITRITE UR-MCNC: POSITIVE
NRBC # BLD: 0 /100 WBCS — SIGNIFICANT CHANGE UP (ref 0–0)
PH UR: 6 — SIGNIFICANT CHANGE UP (ref 5–8)
PLATELET # BLD AUTO: 212 K/UL — SIGNIFICANT CHANGE UP (ref 150–400)
POTASSIUM SERPL-MCNC: 4 MMOL/L — SIGNIFICANT CHANGE UP (ref 3.5–5.3)
POTASSIUM SERPL-SCNC: 4 MMOL/L — SIGNIFICANT CHANGE UP (ref 3.5–5.3)
PROT SERPL-MCNC: 7.6 G/DL — SIGNIFICANT CHANGE UP (ref 6–8.3)
PROT UR-MCNC: NEGATIVE MG/DL — SIGNIFICANT CHANGE UP
RBC # BLD: 3.72 M/UL — LOW (ref 3.8–5.2)
RBC # FLD: 13.4 % — SIGNIFICANT CHANGE UP (ref 10.3–14.5)
RBC CASTS # UR COMP ASSIST: < 5 /HPF — SIGNIFICANT CHANGE UP
SODIUM SERPL-SCNC: 137 MMOL/L — SIGNIFICANT CHANGE UP (ref 135–145)
SP GR SPEC: 1.01 — SIGNIFICANT CHANGE UP (ref 1–1.03)
UROBILINOGEN FLD QL: 0.2 E.U./DL — SIGNIFICANT CHANGE UP
WBC # BLD: 8.48 K/UL — SIGNIFICANT CHANGE UP (ref 3.8–10.5)
WBC # FLD AUTO: 8.48 K/UL — SIGNIFICANT CHANGE UP (ref 3.8–10.5)
WBC UR QL: ABNORMAL /HPF

## 2021-03-29 PROCEDURE — 93005 ELECTROCARDIOGRAM TRACING: CPT

## 2021-03-29 PROCEDURE — 80053 COMPREHEN METABOLIC PANEL: CPT

## 2021-03-29 PROCEDURE — 99284 EMERGENCY DEPT VISIT MOD MDM: CPT | Mod: 25

## 2021-03-29 PROCEDURE — 86140 C-REACTIVE PROTEIN: CPT

## 2021-03-29 PROCEDURE — 93010 ELECTROCARDIOGRAM REPORT: CPT

## 2021-03-29 PROCEDURE — 81001 URINALYSIS AUTO W/SCOPE: CPT

## 2021-03-29 PROCEDURE — 87040 BLOOD CULTURE FOR BACTERIA: CPT

## 2021-03-29 PROCEDURE — 36415 COLL VENOUS BLD VENIPUNCTURE: CPT

## 2021-03-29 PROCEDURE — 99281 EMR DPT VST MAYX REQ PHY/QHP: CPT | Mod: GC

## 2021-03-29 PROCEDURE — 85025 COMPLETE CBC W/AUTO DIFF WBC: CPT

## 2021-03-29 PROCEDURE — 83605 ASSAY OF LACTIC ACID: CPT

## 2021-03-29 PROCEDURE — 85652 RBC SED RATE AUTOMATED: CPT

## 2021-03-29 PROCEDURE — 82010 KETONE BODYS QUAN: CPT

## 2021-03-29 RX ORDER — SODIUM CHLORIDE 9 MG/ML
1000 INJECTION INTRAMUSCULAR; INTRAVENOUS; SUBCUTANEOUS ONCE
Refills: 0 | Status: COMPLETED | OUTPATIENT
Start: 2021-03-29 | End: 2021-03-29

## 2021-03-29 RX ORDER — METFORMIN HYDROCHLORIDE 850 MG/1
0 TABLET ORAL
Qty: 0 | Refills: 0 | DISCHARGE

## 2021-03-29 RX ORDER — LOSARTAN POTASSIUM 100 MG/1
0 TABLET, FILM COATED ORAL
Qty: 0 | Refills: 0 | DISCHARGE

## 2021-03-29 RX ORDER — APIXABAN 2.5 MG/1
0 TABLET, FILM COATED ORAL
Qty: 0 | Refills: 0 | DISCHARGE

## 2021-03-29 RX ORDER — OMEPRAZOLE 10 MG/1
20 CAPSULE, DELAYED RELEASE ORAL
Qty: 0 | Refills: 0 | DISCHARGE

## 2021-03-29 RX ORDER — OMEPRAZOLE 10 MG/1
0 CAPSULE, DELAYED RELEASE ORAL
Qty: 0 | Refills: 0 | DISCHARGE

## 2021-03-29 RX ORDER — LOSARTAN POTASSIUM 100 MG/1
25 TABLET, FILM COATED ORAL
Qty: 0 | Refills: 0 | DISCHARGE

## 2021-03-29 RX ORDER — APIXABAN 2.5 MG/1
5 TABLET, FILM COATED ORAL
Qty: 0 | Refills: 0 | DISCHARGE

## 2021-03-29 RX ORDER — ATORVASTATIN CALCIUM 80 MG/1
0 TABLET, FILM COATED ORAL
Qty: 0 | Refills: 0 | DISCHARGE

## 2021-03-29 RX ORDER — ACETAMINOPHEN 500 MG
650 TABLET ORAL ONCE
Refills: 0 | Status: COMPLETED | OUTPATIENT
Start: 2021-03-29 | End: 2021-03-29

## 2021-03-29 RX ADMIN — SODIUM CHLORIDE 1000 MILLILITER(S): 9 INJECTION INTRAMUSCULAR; INTRAVENOUS; SUBCUTANEOUS at 14:37

## 2021-03-29 RX ADMIN — Medication 650 MILLIGRAM(S): at 15:35

## 2021-03-29 RX ADMIN — Medication 650 MILLIGRAM(S): at 14:38

## 2021-03-29 RX ADMIN — SODIUM CHLORIDE 1000 MILLILITER(S): 9 INJECTION INTRAMUSCULAR; INTRAVENOUS; SUBCUTANEOUS at 16:01

## 2021-03-29 NOTE — ED ADULT NURSE NOTE - NSIMPLEMENTINTERV_GEN_ALL_ED
Implemented All Fall with Harm Risk Interventions:  [place] to call system. Call bell, personal items and telephone within reach. Instruct patient to call for assistance. Room bathroom lighting operational. Non-slip footwear when patient is off stretcher. Physically safe environment: no spills, clutter or unnecessary equipment. Stretcher in lowest position, wheels locked, appropriate side rails in place. Provide visual cue, wrist band, yellow gown, etc. Monitor gait and stability. Monitor for mental status changes and reorient to person, place, and time. Review medications for side effects contributing to fall risk. Reinforce activity limits and safety measures with patient and family. Provide visual clues: red socks.

## 2021-03-29 NOTE — CONSULT NOTE ADULT - SUBJECTIVE AND OBJECTIVE BOX
Vascular Attending: Dr. Frye    HPI:  74 y/o F PMHx htn, pvd (pt of Dr. Frye), dm, chronic wound on LLE. Presents with c/o worsening pain in the left lower ankle. Pt has a chronic ulcer on the left foot, which is now more painful. Denies fevers, chills, sweats, calf pain, and new injuries. She is concerned as the ulcer has remained unchanged over the past few months. Was last seen in Dr. Frye office on 3/19, was told to come in last friday for unna boot placement but was unable to follow-up due to inability to get a ride.       PAST MEDICAL & SURGICAL HISTORY:      MEDICATIONS  (STANDING):    MEDICATIONS  (PRN):      Allergies    Diovan (Anaphylaxis)  tetracycline (Anaphylaxis)    Intolerances        SOCIAL HISTORY:    FAMILY HISTORY:      Vital Signs Last 24 Hrs  T(C): 36.6 (29 Mar 2021 13:47), Max: 36.6 (29 Mar 2021 13:47)  T(F): 97.9 (29 Mar 2021 13:47), Max: 97.9 (29 Mar 2021 13:47)  HR: 99 (29 Mar 2021 13:47) (99 - 99)  BP: 142/71 (29 Mar 2021 13:47) (142/71 - 142/71)  BP(mean): --  RR: 16 (29 Mar 2021 13:47) (16 - 16)  SpO2: 96% (29 Mar 2021 13:47) (96% - 96%)    PHYSICAL EXAM:  General: AAOx3, NAD  Extremities: 2x2cm superficial venous stasis ulcer over medial aspect of ankle, dry without drainage. Surrounding ecchymosis   Toes WWP, DP 1+ bilaterally, motor/sensation intact      LABS:                        10.9   8.48  )-----------( 212      ( 29 Mar 2021 14:27 )             34.1     03-29    137  |  97  |  16  ----------------------------<  208<H>  4.0   |  26  |  0.66    Ca    10.1      29 Mar 2021 14:27    TPro  7.6  /  Alb  4.3  /  TBili  0.3  /  DBili  x   /  AST  13  /  ALT  9<L>  /  AlkPhos  91  03-29          RADIOLOGY & ADDITIONAL STUDIES

## 2021-03-29 NOTE — ED PROVIDER NOTE - CLINICAL SUMMARY MEDICAL DECISION MAKING FREE TEXT BOX
76 y/o F htn, pvd, dm, chronic LLE wound, presents with venous stasis changes and chronic ulcer to the left ankle. Basic labs ordered; possibility of infection so ESR blood and wound cultures ordered will consult inhouse vascular surgery. 76 y/o F htn, pvd, dm, chronic LLE wound, presents with venous stasis changes and chronic ulcer to the left ankle. Basic labs ordered; possibility of infection so ESR blood and wound cultures ordered will consult inhouse vascular surgery. vascular surgery sAW pt in ed. they examined wound. they also stated pt had multiple appts with them and does not follow up. pt had an appt today but did not go. as per vascular   - Wound appears chronic in nature, does not appear infected at this time. pt given - Placed in unna boot in ED, wrapped in kerlix/ace

## 2021-03-29 NOTE — ED ADULT NURSE NOTE - OBJECTIVE STATEMENT
76 y/o female presents to ED with c/o pain to LLE, reports wound with yellow drainage x months. Ulcer noted to medial aspect of L ankle with brown discoloration surrounding. Hx PVD, pt states she sees Dr Frye. Denies fever/chills.

## 2021-03-29 NOTE — ED PROVIDER NOTE - NSFOLLOWUPINSTRUCTIONS_ED_ALL_ED_FT
- Recommend f/u with Dr. Frye in office next week  - D/w patient importance of follow-up and compliance   -please also follow up with primary care doctor in 1 day  - please take tylenol with recommended dose      Chronic Wounds    WHAT YOU NEED TO KNOW:    A chronic wound is a wound that does not heal completely in 6 weeks. A wound is an injury that causes a break in the skin. There may also be damage to nearby tissues. Examples of wounds that can become chronic are deep ulcers (open sores), large burns, and infected cuts.    DISCHARGE INSTRUCTIONS:    Contact your healthcare provider if:   •You have a fever.       •You have increased or new pain, swelling, redness, or bleeding in or around your wound.      •You have pus or a foul odor coming from your wound.      •Your skin itches or has a rash.      •You have open sores, blisters, or changes in the color or temperature of your skin.       •You have questions or concerns about your condition or care.       Medicines:   •NSAIDs, such as ibuprofen, help decrease swelling, pain, and fever. This medicine is available with or without a doctor's order. NSAIDs can cause stomach bleeding or kidney problems in certain people. If you take blood thinner medicine, always ask if NSAIDs are safe for you. Always read the medicine label and follow directions. Do not give these medicines to children under 6 months of age without direction from your child's healthcare provider.      •Acetaminophen decreases pain and fever. It is available without a doctor's order. Ask how much to take and how often to take it. Follow directions. Read the labels of all other medicines you are using to see if they also contain acetaminophen, or ask your doctor or pharmacist. Acetaminophen can cause liver damage if not taken correctly. Do not use more than 4 grams (4,000 milligrams) total of acetaminophen in one day.       •Antibiotics may be given to prevent or treat an infection caused by bacteria.      •Take your medicine as directed. Contact your healthcare provider if you think your medicine is not helping or if you have side effects. Tell him or her if you are allergic to any medicine. Keep a list of the medicines, vitamins, and herbs you take. Include the amounts, and when and why you take them. Bring the list or the pill bottles to follow-up visits. Carry your medicine list with you in case of an emergency.      Follow up with your healthcare provider as directed: You need to return to have your wound checked. You may also need to have the bandage changed. Write down your questions so you remember to ask them during your visits.    What you need to know about wound care:   •Wash your hands before and after you take care of your wound.      • Keep the bandage clean and dry. Do not stop using the bandage on your wound unless your healthcare provider says it is okay.      •Clean the wound and change the dressing as often as directed by your healthcare provider.       Eat healthy foods and drink liquids as directed: Healthy foods give your body the nutrients it needs to heal your wound. Liquids prevent dehydration that can decrease the blood supply to your wound. Healthy foods include fruits, vegetables, grains (breads and cereals), dairy, and protein foods. Protein foods include meat, fish, nuts, and soy products. Protein, calories, vitamin C, and zinc help wounds heal. Ask for more information about the foods you should eat to improve healing.     Do not smoke: If you smoke, it is never too late to quit. Smoking delays wound healing. Smoking also increases your risk for infection after surgery. Ask your healthcare provider for information if you need help quitting.    Prevent pressure wounds: Pressure wounds can develop when blood flow to an area is blocked. For example, you sit or lie in the same position without moving and put pressure on your heels. You can prevent pressure wounds by doing any of the following:  •Change your position every 15 minutes while you are sitting.       •Change your position every 2 hours while you lie in bed.      •Prop your legs on pillows to lift your heels while you are lying down.      •Check your skin or have someone else check your skin daily. Check the areas that are common to pressure wounds, such as elbows, heels, and buttocks. Common early signs of pressure wounds are open sores, blisters, or changes in color or temperature.

## 2021-03-29 NOTE — CONSULT NOTE ADULT - ASSESSMENT
75F with chronic L venous stasis ulcer of her medial ankle  - No acute intervention at this time  - Wound appears chronic in nature, does not appear infected at this time  - Placed in unna boot in ED, wrapped in kerlix/ace  - Recommend f/u with Dr. Frye in office next week  - D/w patient importance of follow-up and compliance  - Pain medication as per ED for home, pt pain at night not controlled by tylenol  - Ok to DC home from vascular perspective  - D/w chief resident    [id number]

## 2021-03-29 NOTE — ED PROVIDER NOTE - PATIENT PORTAL LINK FT
You can access the FollowMyHealth Patient Portal offered by [facility] by registering at the following website: [email or web address]. By joining [employer]’s FollowMyHealth portal, you will also be able to view your health information using other applications (apps) compatible with our system.

## 2021-03-29 NOTE — ED PROVIDER NOTE - OBJECTIVE STATEMENT
74 y/o F PMHx htn, pvd (followed by vascular), dm, chronic wound on LLE. Presents with c/o worsening pain and joint drainage in the left lower ankle. Pt has a chronic ulcer on the left foot, which is now more painful with yellow drainage. Denies fevers, chills, sweats, calf pain, and new injuries. She is concerned as the ulcer has remained unchanged over the past few months.

## 2021-04-02 DIAGNOSIS — I10 ESSENTIAL (PRIMARY) HYPERTENSION: ICD-10-CM

## 2021-04-02 DIAGNOSIS — I73.9 PERIPHERAL VASCULAR DISEASE, UNSPECIFIED: ICD-10-CM

## 2021-04-02 DIAGNOSIS — I83.023 VARICOSE VEINS OF LEFT LOWER EXTREMITY WITH ULCER OF ANKLE: ICD-10-CM

## 2021-04-02 DIAGNOSIS — Z79.84 LONG TERM (CURRENT) USE OF ORAL HYPOGLYCEMIC DRUGS: ICD-10-CM

## 2021-04-02 DIAGNOSIS — M25.572 PAIN IN LEFT ANKLE AND JOINTS OF LEFT FOOT: ICD-10-CM

## 2021-04-02 DIAGNOSIS — E11.51 TYPE 2 DIABETES MELLITUS WITH DIABETIC PERIPHERAL ANGIOPATHY WITHOUT GANGRENE: ICD-10-CM

## 2021-04-02 DIAGNOSIS — L97.329 NON-PRESSURE CHRONIC ULCER OF LEFT ANKLE WITH UNSPECIFIED SEVERITY: ICD-10-CM

## 2021-04-02 DIAGNOSIS — Z88.1 ALLERGY STATUS TO OTHER ANTIBIOTIC AGENTS STATUS: ICD-10-CM

## 2021-04-02 DIAGNOSIS — Z88.8 ALLERGY STATUS TO OTHER DRUGS, MEDICAMENTS AND BIOLOGICAL SUBSTANCES STATUS: ICD-10-CM

## 2021-04-02 PROBLEM — E11.9 TYPE 2 DIABETES MELLITUS WITHOUT COMPLICATIONS: Chronic | Status: ACTIVE | Noted: 2021-03-29

## 2021-04-03 LAB
CULTURE RESULTS: SIGNIFICANT CHANGE UP
CULTURE RESULTS: SIGNIFICANT CHANGE UP
SPECIMEN SOURCE: SIGNIFICANT CHANGE UP
SPECIMEN SOURCE: SIGNIFICANT CHANGE UP

## 2021-04-05 ENCOUNTER — APPOINTMENT (OUTPATIENT)
Dept: VASCULAR SURGERY | Facility: CLINIC | Age: 76
End: 2021-04-05

## 2021-08-18 ENCOUNTER — INPATIENT (INPATIENT)
Facility: HOSPITAL | Age: 76
LOS: 5 days | Discharge: NURSING, REHAB OR LONG-TERM CARE | DRG: 638 | End: 2021-08-24
Attending: SURGERY | Admitting: SURGERY
Payer: MEDICARE

## 2021-08-18 VITALS
HEIGHT: 64 IN | OXYGEN SATURATION: 95 % | WEIGHT: 139.99 LBS | TEMPERATURE: 98 F | SYSTOLIC BLOOD PRESSURE: 161 MMHG | RESPIRATION RATE: 18 BRPM | HEART RATE: 93 BPM | DIASTOLIC BLOOD PRESSURE: 75 MMHG

## 2021-08-18 LAB
ALBUMIN SERPL ELPH-MCNC: 3.9 G/DL — SIGNIFICANT CHANGE UP (ref 3.3–5)
ALP SERPL-CCNC: 90 U/L — SIGNIFICANT CHANGE UP (ref 40–120)
ALT FLD-CCNC: 6 U/L — LOW (ref 10–45)
ANION GAP SERPL CALC-SCNC: 10 MMOL/L — SIGNIFICANT CHANGE UP (ref 5–17)
APPEARANCE UR: CLEAR — SIGNIFICANT CHANGE UP
AST SERPL-CCNC: 12 U/L — SIGNIFICANT CHANGE UP (ref 10–40)
BASOPHILS # BLD AUTO: 0.03 K/UL — SIGNIFICANT CHANGE UP (ref 0–0.2)
BASOPHILS NFR BLD AUTO: 0.4 % — SIGNIFICANT CHANGE UP (ref 0–2)
BILIRUB SERPL-MCNC: 0.3 MG/DL — SIGNIFICANT CHANGE UP (ref 0.2–1.2)
BILIRUB UR-MCNC: NEGATIVE — SIGNIFICANT CHANGE UP
BUN SERPL-MCNC: 18 MG/DL — SIGNIFICANT CHANGE UP (ref 7–23)
CALCIUM SERPL-MCNC: 10.3 MG/DL — SIGNIFICANT CHANGE UP (ref 8.4–10.5)
CHLORIDE SERPL-SCNC: 104 MMOL/L — SIGNIFICANT CHANGE UP (ref 96–108)
CO2 SERPL-SCNC: 28 MMOL/L — SIGNIFICANT CHANGE UP (ref 22–31)
COLOR SPEC: YELLOW — SIGNIFICANT CHANGE UP
CREAT SERPL-MCNC: 0.66 MG/DL — SIGNIFICANT CHANGE UP (ref 0.5–1.3)
CRP SERPL-MCNC: 27.5 MG/L — HIGH (ref 0–4)
DIFF PNL FLD: NEGATIVE — SIGNIFICANT CHANGE UP
EOSINOPHIL # BLD AUTO: 0.04 K/UL — SIGNIFICANT CHANGE UP (ref 0–0.5)
EOSINOPHIL NFR BLD AUTO: 0.6 % — SIGNIFICANT CHANGE UP (ref 0–6)
ERYTHROCYTE [SEDIMENTATION RATE] IN BLOOD: 86 MM/HR — HIGH
GLUCOSE BLDC GLUCOMTR-MCNC: 123 MG/DL — HIGH (ref 70–99)
GLUCOSE SERPL-MCNC: 91 MG/DL — SIGNIFICANT CHANGE UP (ref 70–99)
GLUCOSE UR QL: NEGATIVE — SIGNIFICANT CHANGE UP
HCT VFR BLD CALC: 32.9 % — LOW (ref 34.5–45)
HGB BLD-MCNC: 10.5 G/DL — LOW (ref 11.5–15.5)
IMM GRANULOCYTES NFR BLD AUTO: 0.4 % — SIGNIFICANT CHANGE UP (ref 0–1.5)
KETONES UR-MCNC: NEGATIVE — SIGNIFICANT CHANGE UP
LACTATE SERPL-SCNC: 1.3 MMOL/L — SIGNIFICANT CHANGE UP (ref 0.5–2)
LEUKOCYTE ESTERASE UR-ACNC: NEGATIVE — SIGNIFICANT CHANGE UP
LYMPHOCYTES # BLD AUTO: 2.41 K/UL — SIGNIFICANT CHANGE UP (ref 1–3.3)
LYMPHOCYTES # BLD AUTO: 33.3 % — SIGNIFICANT CHANGE UP (ref 13–44)
MCHC RBC-ENTMCNC: 28.6 PG — SIGNIFICANT CHANGE UP (ref 27–34)
MCHC RBC-ENTMCNC: 31.9 GM/DL — LOW (ref 32–36)
MCV RBC AUTO: 89.6 FL — SIGNIFICANT CHANGE UP (ref 80–100)
MONOCYTES # BLD AUTO: 0.59 K/UL — SIGNIFICANT CHANGE UP (ref 0–0.9)
MONOCYTES NFR BLD AUTO: 8.2 % — SIGNIFICANT CHANGE UP (ref 2–14)
NEUTROPHILS # BLD AUTO: 4.13 K/UL — SIGNIFICANT CHANGE UP (ref 1.8–7.4)
NEUTROPHILS NFR BLD AUTO: 57.1 % — SIGNIFICANT CHANGE UP (ref 43–77)
NITRITE UR-MCNC: NEGATIVE — SIGNIFICANT CHANGE UP
NRBC # BLD: 0 /100 WBCS — SIGNIFICANT CHANGE UP (ref 0–0)
PH UR: 7 — SIGNIFICANT CHANGE UP (ref 5–8)
PLATELET # BLD AUTO: 250 K/UL — SIGNIFICANT CHANGE UP (ref 150–400)
POTASSIUM SERPL-MCNC: 4.7 MMOL/L — SIGNIFICANT CHANGE UP (ref 3.5–5.3)
POTASSIUM SERPL-SCNC: 4.7 MMOL/L — SIGNIFICANT CHANGE UP (ref 3.5–5.3)
PROT SERPL-MCNC: 7.7 G/DL — SIGNIFICANT CHANGE UP (ref 6–8.3)
PROT UR-MCNC: NEGATIVE MG/DL — SIGNIFICANT CHANGE UP
RBC # BLD: 3.67 M/UL — LOW (ref 3.8–5.2)
RBC # FLD: 13.6 % — SIGNIFICANT CHANGE UP (ref 10.3–14.5)
SARS-COV-2 RNA SPEC QL NAA+PROBE: SIGNIFICANT CHANGE UP
SODIUM SERPL-SCNC: 142 MMOL/L — SIGNIFICANT CHANGE UP (ref 135–145)
SP GR SPEC: 1.01 — SIGNIFICANT CHANGE UP (ref 1–1.03)
UROBILINOGEN FLD QL: 0.2 E.U./DL — SIGNIFICANT CHANGE UP
WBC # BLD: 7.23 K/UL — SIGNIFICANT CHANGE UP (ref 3.8–10.5)
WBC # FLD AUTO: 7.23 K/UL — SIGNIFICANT CHANGE UP (ref 3.8–10.5)

## 2021-08-18 PROCEDURE — 73630 X-RAY EXAM OF FOOT: CPT | Mod: 26,LT

## 2021-08-18 PROCEDURE — 73610 X-RAY EXAM OF ANKLE: CPT | Mod: 26,LT

## 2021-08-18 PROCEDURE — 99285 EMERGENCY DEPT VISIT HI MDM: CPT

## 2021-08-18 PROCEDURE — 93010 ELECTROCARDIOGRAM REPORT: CPT

## 2021-08-18 PROCEDURE — 99233 SBSQ HOSP IP/OBS HIGH 50: CPT | Mod: GC

## 2021-08-18 RX ORDER — ACETAMINOPHEN 500 MG
650 TABLET ORAL EVERY 6 HOURS
Refills: 0 | Status: DISCONTINUED | OUTPATIENT
Start: 2021-08-18 | End: 2021-08-24

## 2021-08-18 RX ORDER — HYDROMORPHONE HYDROCHLORIDE 2 MG/ML
1 INJECTION INTRAMUSCULAR; INTRAVENOUS; SUBCUTANEOUS ONCE
Refills: 0 | Status: DISCONTINUED | OUTPATIENT
Start: 2021-08-18 | End: 2021-08-18

## 2021-08-18 RX ORDER — INSULIN LISPRO 100/ML
VIAL (ML) SUBCUTANEOUS
Refills: 0 | Status: DISCONTINUED | OUTPATIENT
Start: 2021-08-18 | End: 2021-08-24

## 2021-08-18 RX ORDER — DEXTROSE 50 % IN WATER 50 %
15 SYRINGE (ML) INTRAVENOUS ONCE
Refills: 0 | Status: DISCONTINUED | OUTPATIENT
Start: 2021-08-18 | End: 2021-08-24

## 2021-08-18 RX ORDER — CITALOPRAM 10 MG/1
0 TABLET, FILM COATED ORAL
Qty: 0 | Refills: 0 | DISCHARGE

## 2021-08-18 RX ORDER — SODIUM CHLORIDE 9 MG/ML
1000 INJECTION, SOLUTION INTRAVENOUS
Refills: 0 | Status: DISCONTINUED | OUTPATIENT
Start: 2021-08-18 | End: 2021-08-24

## 2021-08-18 RX ORDER — ATORVASTATIN CALCIUM 80 MG/1
20 TABLET, FILM COATED ORAL AT BEDTIME
Refills: 0 | Status: DISCONTINUED | OUTPATIENT
Start: 2021-08-18 | End: 2021-08-24

## 2021-08-18 RX ORDER — GABAPENTIN 400 MG/1
300 CAPSULE ORAL
Refills: 0 | Status: DISCONTINUED | OUTPATIENT
Start: 2021-08-18 | End: 2021-08-19

## 2021-08-18 RX ORDER — GLUCAGON INJECTION, SOLUTION 0.5 MG/.1ML
1 INJECTION, SOLUTION SUBCUTANEOUS ONCE
Refills: 0 | Status: DISCONTINUED | OUTPATIENT
Start: 2021-08-18 | End: 2021-08-24

## 2021-08-18 RX ORDER — APIXABAN 2.5 MG/1
5 TABLET, FILM COATED ORAL
Refills: 0 | Status: DISCONTINUED | OUTPATIENT
Start: 2021-08-18 | End: 2021-08-24

## 2021-08-18 RX ORDER — MORPHINE SULFATE 50 MG/1
1 CAPSULE, EXTENDED RELEASE ORAL EVERY 4 HOURS
Refills: 0 | Status: DISCONTINUED | OUTPATIENT
Start: 2021-08-18 | End: 2021-08-24

## 2021-08-18 RX ORDER — PANTOPRAZOLE SODIUM 20 MG/1
40 TABLET, DELAYED RELEASE ORAL
Refills: 0 | Status: DISCONTINUED | OUTPATIENT
Start: 2021-08-18 | End: 2021-08-24

## 2021-08-18 RX ORDER — LOSARTAN POTASSIUM 100 MG/1
25 TABLET, FILM COATED ORAL DAILY
Refills: 0 | Status: DISCONTINUED | OUTPATIENT
Start: 2021-08-18 | End: 2021-08-24

## 2021-08-18 RX ORDER — DEXTROSE 50 % IN WATER 50 %
25 SYRINGE (ML) INTRAVENOUS ONCE
Refills: 0 | Status: DISCONTINUED | OUTPATIENT
Start: 2021-08-18 | End: 2021-08-24

## 2021-08-18 RX ORDER — GABAPENTIN 400 MG/1
300 CAPSULE ORAL
Refills: 0 | Status: DISCONTINUED | OUTPATIENT
Start: 2021-08-18 | End: 2021-08-18

## 2021-08-18 RX ORDER — OXYCODONE HYDROCHLORIDE 5 MG/1
5 TABLET ORAL EVERY 4 HOURS
Refills: 0 | Status: DISCONTINUED | OUTPATIENT
Start: 2021-08-18 | End: 2021-08-24

## 2021-08-18 RX ORDER — ACETIC ACID
1 LIQUID (ML) MISCELLANEOUS
Refills: 0 | Status: DISCONTINUED | OUTPATIENT
Start: 2021-08-18 | End: 2021-08-24

## 2021-08-18 RX ADMIN — HYDROMORPHONE HYDROCHLORIDE 1 MILLIGRAM(S): 2 INJECTION INTRAMUSCULAR; INTRAVENOUS; SUBCUTANEOUS at 18:30

## 2021-08-18 RX ADMIN — APIXABAN 5 MILLIGRAM(S): 2.5 TABLET, FILM COATED ORAL at 19:56

## 2021-08-18 RX ADMIN — HYDROMORPHONE HYDROCHLORIDE 1 MILLIGRAM(S): 2 INJECTION INTRAMUSCULAR; INTRAVENOUS; SUBCUTANEOUS at 18:05

## 2021-08-18 RX ADMIN — HYDROMORPHONE HYDROCHLORIDE 1 MILLIGRAM(S): 2 INJECTION INTRAMUSCULAR; INTRAVENOUS; SUBCUTANEOUS at 17:59

## 2021-08-18 RX ADMIN — Medication 1 TABLET(S): at 20:49

## 2021-08-18 RX ADMIN — Medication 0: at 22:22

## 2021-08-18 RX ADMIN — ATORVASTATIN CALCIUM 20 MILLIGRAM(S): 80 TABLET, FILM COATED ORAL at 21:03

## 2021-08-18 NOTE — ED ADULT NURSE NOTE - CCCP TRG CHIEF CMPLNT
Patient is a 71y old  Female who presents with a chief complaint of right leg wound(04 Mar 2018 15:38)Transferred to ICU for hypoxia requiring High flow     Overnight events     no events   Afebrile   No diarrhea     Review of Systems     None other than above     VITAL SIGNS     ICU Vital Signs Last 24 Hrs  T(C): 36.5 (11 Mar 2018 08:00), Max: 36.9 (11 Mar 2018 00:00)  T(F): 97.7 (11 Mar 2018 08:00), Max: 98.4 (11 Mar 2018 00:00)  HR: 70 (11 Mar 2018 08:00) (68 - 104)  BP: 132/70 (11 Mar 2018 08:00) (111/57 - 139/68)  BP(mean): 100 (11 Mar 2018 06:00) (72 - 102)  ABP: --  ABP(mean): --  RR: 36 (11 Mar 2018 08:00) (24 - 56)  SpO2: 91% (11 Mar 2018 08:00) (82% - 98%)      I&O's Detail    10 Mar 2018 06:01  -  11 Mar 2018 07:00  --------------------------------------------------------  IN:    FFP (Fresh Frozen Plasma): 566 mL    IV PiggyBack: 250 mL    Oral Fluid: 480 mL    Packed Red Blood Cells: 336 mL  Total IN: 1632 mL    OUT:    Voided: 600 mL  Total OUT: 600 mL    Total NET: 1032 mL    Physical Exam    GENERAL: NAD, well-developed  HEAD:  Atraumatic, Normocephalic  EYES: EOMI, PERRLA, conjunctiva and sclera clear  NECK: Supple, No JVD  CHEST/LUNG: Clear to auscultation bilaterally; No wheeze  HEART: Regular rate and rhythm; No murmurs, rubs, or gallops  ABDOMEN: Soft, Nontender, Nondistended; Bowel sounds present  EXTREMITIES:  2+ Peripheral Pulses, No clubbing, cyanosis, or edema  PSYCH: AAO x 3   NEUROLOGY: non-focal  SKIN: No rashes or lesions    LABS                          7.3    8.50  )-----------( 341      ( 11 Mar 2018 04:51 )             22.9       03-11    136  |  97<L>  |  39<H>  ----------------------------<  249<H>  4.1   |  29  |  1.8<H>    Ca    8.7      11 Mar 2018 04:51    TPro  5.7<L>  /  Alb  3.0  /  TBili  1.7<H>  /  DBili  0.8<H>  /  AST  30  /  ALT  36  /  AlkPhos  91  03-11      LIVER FUNCTIONS - ( 11 Mar 2018 04:51 )  Alb: 3.0 g/dL / Pro: 5.7 g/dL / ALK PHOS: 91 U/L / ALT: 36 U/L / AST: 30 U/L / GGT: x           PT/INR - ( 11 Mar 2018 04:51 )   PT: 16.70 sec;   INR: 1.53 ratio         PTT - ( 11 Mar 2018 04:51 )  PTT:23.9 sec      Culture - Bronchial (collected 09 Mar 2018 09:30)  Source: Bronch Wash BAL  Gram Stain (09 Mar 2018 23:58):    Few polymorphonuclear leukocytes per low power field    Moderate Squamous epithelial cells per low power field    Few Yeast like cells per oil power field    Moderate Gram Positive Cocci in Clusters per oil power field    Few Gram Negative Diplococci per oil power field    Rare Gram Negative Rods per oil power field  Preliminary Report (10 Mar 2018 17:13):    Moderate Staphylococcus aureus    Normal Respiratory Nikki present    Culture - Acid Fast - Bronchial w/Smear (collected 09 Mar 2018 09:30)  Source: .Broncial Bronchoalveolar Lavage    MEDS    MEDICATIONS  (STANDING):  ALBUTerol    90 MICROgram(s) HFA Inhaler 1 Puff(s) Inhalation every 4 hours  ALBUTerol/ipratropium for Nebulization 3 milliLiter(s) Nebulizer every 6 hours  chlorhexidine 4% Liquid 1 Application(s) Topical daily  docusate sodium 100 milliGRAM(s) Oral three times a day  meropenem  IVPB      meropenem  IVPB 1000 milliGRAM(s) IV Intermittent every 8 hours  methylPREDNISolone sodium succinate IVPB 250 milliGRAM(s) IV Intermittent every 6 hours  metoprolol     tartrate 25 milliGRAM(s) Oral two times a day  montelukast 10 milliGRAM(s) Oral daily  pantoprazole    Tablet 40 milliGRAM(s) Oral before breakfast  senna 2 Tablet(s) Oral at bedtime  tiotropium 18 MICROgram(s) Capsule 1 Capsule(s) Inhalation daily  vancomycin  IVPB      vancomycin  IVPB 750 milliGRAM(s) IV Intermittent every 24 hours    MEDICATIONS  (PRN):  acetaminophen   Tablet. 650 milliGRAM(s) Oral every 6 hours PRN Moderate Pain (4 - 6)  morphine  - Injectable 2 milliGRAM(s) IV Push every 4 hours PRN Moderate Pain (4 - 6)  ondansetron Injectable 4 milliGRAM(s) IV Push every 8 hours PRN Nausea and/or Vomiting  polyethylene glycol 3350 17 Gram(s) Oral daily PRN Constipation      Radiology :    CXR worsening bilateral infiltrates   CT foot pain/injury 3 = assistive equipment and person

## 2021-08-18 NOTE — ED ADULT TRIAGE NOTE - OTHER COMPLAINTS
patient BIBEMS from home c/o diabetic wound to LLE/ankle/foot x 5 years-- reports increase in pain, +redness, +swelling-- denies fevers/chills

## 2021-08-18 NOTE — ED PROVIDER NOTE - PHYSICAL EXAMINATION
CONSTITUTIONAL: older woman, in moderate distress  HEAD: Normocephalic; atraumatic.   EYES:  conjunctiva and sclera clear  ENT: normal nose; no rhinorrhea; normal pharynx with no erythema or lesions.   NECK: Supple; non-tender;   CARDIOVASCULAR: Normal S1, S2; no murmurs, rubs, or gallops. Regular rate and rhythm.   RESPIRATORY: Breathing easily; breath sounds clear and equal bilaterally; no wheezes, rhonchi, or rales.  GI: Soft; non-distended; non-tender; no palpable organomegaly.   EXT: RLE normal appearing, warm well perfused, LLE with large venous ulcer over the medial malleolus extensively covered with dried green scab. Meenakshi ulcer erythema, edema and warmth. unable to palpate pulse.  SKIN: Normal for age and race; warm; dry; good turgor; no apparent lesions or rash.   NEURO: A & O x 3; face symmetric; grossly unremarkable.   PSYCHOLOGICAL: The patient’s mood and manner are appropriate.

## 2021-08-18 NOTE — ED PROVIDER NOTE - CLINICAL SUMMARY MEDICAL DECISION MAKING FREE TEXT BOX
here w/ worsening LLE ulcer, concerning for infection now. seen by vascular, plan for admission for further workup, management, and monitoring of wound

## 2021-08-18 NOTE — ED PROVIDER NOTE - OBJECTIVE STATEMENT
74 y/o F with PMH of HLD, HTN, DM known LLE venous stasis ulcer x 1 year, acutely worsening in the past month despite wound care, and now for the past week with new discharge, increased pain, difficulty bearing weight. never had this before. denies stents. wound care has not been helping, no recent abx, hasn't seen vascular since march.

## 2021-08-18 NOTE — H&P ADULT - NSHPPHYSICALEXAM_GEN_ALL_CORE
He began maintenance the Revlimid in April 2017. Within a week of starting Revlimid, he developed shingles on his back. He was treated with Valcyclovir for this with good response. He has completed one cycle of Revlimid and then took a week off. He has just begun his second cycle. He has noticed in his hands has increased somewhat. He denies any nausea or vomiting. He has not noticed any increase in fatigue. His appetite is good and he has not had any diarrhea. He has not noticed any blood in urine or stool.
NAD but she appear to be in discomfort  Respiratory: nonlabored breathing  Ext: WWP. Right: unremarkable with palpable DP and biphasic PT  Left: large venous ulcer over the medial malleolus extensively covered with dried green scab. Meenakshi ulcer erythema, edema grade 2 and warmth. biphasic DP, PT

## 2021-08-18 NOTE — ED ADULT NURSE NOTE - OBJECTIVE STATEMENT
pt is 76y female, BIBA from home for a non-healing wound to LT ankle area x1 year, pt reports pt is 76y female, BIBA from home for a non-healing wound to LT ankle area x1 year, pt reports she was recently seen at another ED for same but currently not on abx, denies any fevers, n, v, d, pt A&Ox3, speaking in full sentences, LT lower extremity with darkening of the skin, peeling, redness and foul smelling drainage, pedal pulses barely palpable bilat

## 2021-08-18 NOTE — H&P ADULT - ASSESSMENT
75 yo female w/ pmh of htn, dm, pvd who presented to the ED today for infected venous ulcer of the LLE. She had this ulcer for a year and reported that it has been this hot, red and tender for a month now. She follows with Dr. Frye. She also reported that she went to [facility] on Monday where they started her on Antibiotics. She was able to get her antibiotics from the pharmacy and get her first dose yesterday. She reported that her ulcer is very painful that she can barely walk. She reported chills but denies any fever. On exam, She is NAD but she appear to be in discomfort. Ext: WWP. Right: unremarkable with palpable DP and biphasic PT. left leg: large venous ulcer over the medial malleolus extensively covered with dried green scab. Meenakshi ulcer erythema, edema grade 2 and warmth. biphasic DP, PT. WE attempted to debride the scab off her ulcer at bedside after 2 gm of Dilaudid however, she was in extreme pain and was not able to tolerate the procedure very well. The plan is   -admit her overnight and discharge tomorrow vs Friday  - restart all of her home medication including keeping her on her home PO antibiotics  -Wet to dry dressing change with acetic acid twice a day.  -pain control  -am labs

## 2021-08-18 NOTE — ED ADULT NURSE NOTE - NSICDXPASTMEDICALHX_GEN_ALL_CORE_FT
PAST MEDICAL HISTORY:  DM (diabetes mellitus)     HTN (hypertension)     PVD (peripheral vascular disease)

## 2021-08-18 NOTE — H&P ADULT - HISTORY OF PRESENT ILLNESS
75 yo female w/ pmh of htn, dm, pvd who presented to the ED today for infected venous ulcer of the LLE. She had this ulcer for a year and reported that it has been this hot, red and tender for a month now. She follows with Dr. Frye. She also reported that she went to [facility] on Monday where they started her on Antibiotics. She was able to get her antibiotics from the pharmacy and get her first dose yesterday. She reported that her ulcer is very painful that she can barely walk. She reported chills but denies any fever.

## 2021-08-19 LAB
A1C WITH ESTIMATED AVERAGE GLUCOSE RESULT: 6.6 % — HIGH (ref 4–5.6)
ANION GAP SERPL CALC-SCNC: 12 MMOL/L — SIGNIFICANT CHANGE UP (ref 5–17)
BASOPHILS # BLD AUTO: 0.03 K/UL — SIGNIFICANT CHANGE UP (ref 0–0.2)
BASOPHILS NFR BLD AUTO: 0.3 % — SIGNIFICANT CHANGE UP (ref 0–2)
BUN SERPL-MCNC: 16 MG/DL — SIGNIFICANT CHANGE UP (ref 7–23)
CALCIUM SERPL-MCNC: 9.7 MG/DL — SIGNIFICANT CHANGE UP (ref 8.4–10.5)
CHLORIDE SERPL-SCNC: 102 MMOL/L — SIGNIFICANT CHANGE UP (ref 96–108)
CO2 SERPL-SCNC: 23 MMOL/L — SIGNIFICANT CHANGE UP (ref 22–31)
COVID-19 SPIKE DOMAIN AB INTERP: POSITIVE
COVID-19 SPIKE DOMAIN ANTIBODY RESULT: >250 U/ML — HIGH
CREAT SERPL-MCNC: 0.57 MG/DL — SIGNIFICANT CHANGE UP (ref 0.5–1.3)
EOSINOPHIL # BLD AUTO: 0.03 K/UL — SIGNIFICANT CHANGE UP (ref 0–0.5)
EOSINOPHIL NFR BLD AUTO: 0.3 % — SIGNIFICANT CHANGE UP (ref 0–6)
ESTIMATED AVERAGE GLUCOSE: 143 MG/DL — HIGH (ref 68–114)
GLUCOSE BLDC GLUCOMTR-MCNC: 158 MG/DL — HIGH (ref 70–99)
GLUCOSE BLDC GLUCOMTR-MCNC: 189 MG/DL — HIGH (ref 70–99)
GLUCOSE BLDC GLUCOMTR-MCNC: 318 MG/DL — HIGH (ref 70–99)
GLUCOSE BLDC GLUCOMTR-MCNC: 95 MG/DL — SIGNIFICANT CHANGE UP (ref 70–99)
GLUCOSE SERPL-MCNC: 94 MG/DL — SIGNIFICANT CHANGE UP (ref 70–99)
HCT VFR BLD CALC: 34.2 % — LOW (ref 34.5–45)
HGB BLD-MCNC: 10.7 G/DL — LOW (ref 11.5–15.5)
IMM GRANULOCYTES NFR BLD AUTO: 0.3 % — SIGNIFICANT CHANGE UP (ref 0–1.5)
LYMPHOCYTES # BLD AUTO: 1.79 K/UL — SIGNIFICANT CHANGE UP (ref 1–3.3)
LYMPHOCYTES # BLD AUTO: 19.6 % — SIGNIFICANT CHANGE UP (ref 13–44)
MAGNESIUM SERPL-MCNC: 1.6 MG/DL — SIGNIFICANT CHANGE UP (ref 1.6–2.6)
MCHC RBC-ENTMCNC: 28.5 PG — SIGNIFICANT CHANGE UP (ref 27–34)
MCHC RBC-ENTMCNC: 31.3 GM/DL — LOW (ref 32–36)
MCV RBC AUTO: 91.2 FL — SIGNIFICANT CHANGE UP (ref 80–100)
MONOCYTES # BLD AUTO: 0.72 K/UL — SIGNIFICANT CHANGE UP (ref 0–0.9)
MONOCYTES NFR BLD AUTO: 7.9 % — SIGNIFICANT CHANGE UP (ref 2–14)
NEUTROPHILS # BLD AUTO: 6.51 K/UL — SIGNIFICANT CHANGE UP (ref 1.8–7.4)
NEUTROPHILS NFR BLD AUTO: 71.6 % — SIGNIFICANT CHANGE UP (ref 43–77)
NRBC # BLD: 0 /100 WBCS — SIGNIFICANT CHANGE UP (ref 0–0)
PHOSPHATE SERPL-MCNC: 3.4 MG/DL — SIGNIFICANT CHANGE UP (ref 2.5–4.5)
PLATELET # BLD AUTO: 153 K/UL — SIGNIFICANT CHANGE UP (ref 150–400)
POTASSIUM SERPL-MCNC: 4.2 MMOL/L — SIGNIFICANT CHANGE UP (ref 3.5–5.3)
POTASSIUM SERPL-SCNC: 4.2 MMOL/L — SIGNIFICANT CHANGE UP (ref 3.5–5.3)
RBC # BLD: 3.75 M/UL — LOW (ref 3.8–5.2)
RBC # FLD: 13.4 % — SIGNIFICANT CHANGE UP (ref 10.3–14.5)
SARS-COV-2 IGG+IGM SERPL QL IA: >250 U/ML — HIGH
SARS-COV-2 IGG+IGM SERPL QL IA: POSITIVE
SODIUM SERPL-SCNC: 137 MMOL/L — SIGNIFICANT CHANGE UP (ref 135–145)
WBC # BLD: 9.11 K/UL — SIGNIFICANT CHANGE UP (ref 3.8–10.5)
WBC # FLD AUTO: 9.11 K/UL — SIGNIFICANT CHANGE UP (ref 3.8–10.5)

## 2021-08-19 PROCEDURE — 99233 SBSQ HOSP IP/OBS HIGH 50: CPT | Mod: GC

## 2021-08-19 PROCEDURE — 99223 1ST HOSP IP/OBS HIGH 75: CPT

## 2021-08-19 RX ORDER — HYDROMORPHONE HYDROCHLORIDE 2 MG/ML
1 INJECTION INTRAMUSCULAR; INTRAVENOUS; SUBCUTANEOUS ONCE
Refills: 0 | Status: DISCONTINUED | OUTPATIENT
Start: 2021-08-19 | End: 2021-08-19

## 2021-08-19 RX ORDER — GABAPENTIN 400 MG/1
300 CAPSULE ORAL THREE TIMES A DAY
Refills: 0 | Status: DISCONTINUED | OUTPATIENT
Start: 2021-08-19 | End: 2021-08-24

## 2021-08-19 RX ORDER — PIPERACILLIN AND TAZOBACTAM 4; .5 G/20ML; G/20ML
3.38 INJECTION, POWDER, LYOPHILIZED, FOR SOLUTION INTRAVENOUS EVERY 6 HOURS
Refills: 0 | Status: DISCONTINUED | OUTPATIENT
Start: 2021-08-19 | End: 2021-08-20

## 2021-08-19 RX ORDER — VANCOMYCIN HCL 1 G
1000 VIAL (EA) INTRAVENOUS EVERY 12 HOURS
Refills: 0 | Status: DISCONTINUED | OUTPATIENT
Start: 2021-08-19 | End: 2021-08-20

## 2021-08-19 RX ORDER — MAGNESIUM SULFATE 500 MG/ML
1 VIAL (ML) INJECTION ONCE
Refills: 0 | Status: COMPLETED | OUTPATIENT
Start: 2021-08-19 | End: 2021-08-19

## 2021-08-19 RX ADMIN — OXYCODONE HYDROCHLORIDE 5 MILLIGRAM(S): 5 TABLET ORAL at 17:10

## 2021-08-19 RX ADMIN — Medication 1 APPLICATION(S): at 18:24

## 2021-08-19 RX ADMIN — MORPHINE SULFATE 1 MILLIGRAM(S): 50 CAPSULE, EXTENDED RELEASE ORAL at 12:56

## 2021-08-19 RX ADMIN — OXYCODONE HYDROCHLORIDE 5 MILLIGRAM(S): 5 TABLET ORAL at 00:59

## 2021-08-19 RX ADMIN — PANTOPRAZOLE SODIUM 40 MILLIGRAM(S): 20 TABLET, DELAYED RELEASE ORAL at 06:27

## 2021-08-19 RX ADMIN — Medication 8: at 11:57

## 2021-08-19 RX ADMIN — HYDROMORPHONE HYDROCHLORIDE 1 MILLIGRAM(S): 2 INJECTION INTRAMUSCULAR; INTRAVENOUS; SUBCUTANEOUS at 07:45

## 2021-08-19 RX ADMIN — GABAPENTIN 300 MILLIGRAM(S): 400 CAPSULE ORAL at 11:56

## 2021-08-19 RX ADMIN — Medication 2: at 18:17

## 2021-08-19 RX ADMIN — PIPERACILLIN AND TAZOBACTAM 200 GRAM(S): 4; .5 INJECTION, POWDER, LYOPHILIZED, FOR SOLUTION INTRAVENOUS at 18:17

## 2021-08-19 RX ADMIN — APIXABAN 5 MILLIGRAM(S): 2.5 TABLET, FILM COATED ORAL at 06:07

## 2021-08-19 RX ADMIN — Medication 250 MILLIGRAM(S): at 22:40

## 2021-08-19 RX ADMIN — MORPHINE SULFATE 1 MILLIGRAM(S): 50 CAPSULE, EXTENDED RELEASE ORAL at 13:20

## 2021-08-19 RX ADMIN — OXYCODONE HYDROCHLORIDE 5 MILLIGRAM(S): 5 TABLET ORAL at 07:45

## 2021-08-19 RX ADMIN — HYDROMORPHONE HYDROCHLORIDE 1 MILLIGRAM(S): 2 INJECTION INTRAMUSCULAR; INTRAVENOUS; SUBCUTANEOUS at 07:12

## 2021-08-19 RX ADMIN — Medication 2: at 22:37

## 2021-08-19 RX ADMIN — Medication 250 MILLIGRAM(S): at 10:02

## 2021-08-19 RX ADMIN — Medication 1 APPLICATION(S): at 10:51

## 2021-08-19 RX ADMIN — LOSARTAN POTASSIUM 25 MILLIGRAM(S): 100 TABLET, FILM COATED ORAL at 06:06

## 2021-08-19 RX ADMIN — APIXABAN 5 MILLIGRAM(S): 2.5 TABLET, FILM COATED ORAL at 18:16

## 2021-08-19 RX ADMIN — OXYCODONE HYDROCHLORIDE 5 MILLIGRAM(S): 5 TABLET ORAL at 22:53

## 2021-08-19 RX ADMIN — OXYCODONE HYDROCHLORIDE 5 MILLIGRAM(S): 5 TABLET ORAL at 23:41

## 2021-08-19 RX ADMIN — Medication 1 TABLET(S): at 06:07

## 2021-08-19 RX ADMIN — GABAPENTIN 300 MILLIGRAM(S): 400 CAPSULE ORAL at 22:39

## 2021-08-19 RX ADMIN — Medication 100 GRAM(S): at 10:02

## 2021-08-19 RX ADMIN — ATORVASTATIN CALCIUM 20 MILLIGRAM(S): 80 TABLET, FILM COATED ORAL at 22:40

## 2021-08-19 RX ADMIN — OXYCODONE HYDROCHLORIDE 5 MILLIGRAM(S): 5 TABLET ORAL at 01:30

## 2021-08-19 RX ADMIN — GABAPENTIN 300 MILLIGRAM(S): 400 CAPSULE ORAL at 06:06

## 2021-08-19 RX ADMIN — OXYCODONE HYDROCHLORIDE 5 MILLIGRAM(S): 5 TABLET ORAL at 18:15

## 2021-08-19 RX ADMIN — OXYCODONE HYDROCHLORIDE 5 MILLIGRAM(S): 5 TABLET ORAL at 06:27

## 2021-08-19 RX ADMIN — PIPERACILLIN AND TAZOBACTAM 200 GRAM(S): 4; .5 INJECTION, POWDER, LYOPHILIZED, FOR SOLUTION INTRAVENOUS at 12:56

## 2021-08-19 NOTE — CONSULT NOTE ADULT - ASSESSMENT
76YOF with history of non-insulin dependent type 2 diabetes (a1c 6.6% this admission), essential hypertension, GERD, PAD, on Eliquis, admitted for infected diabetic foot ulcer of LLE.    Diabetic foot infection  LLE diabetic ulcer  PAD  Has had ulcer for about a year but worsening pain/erythema over one month. Treated with Bactrim/Augmentin at OSH.   -on vancomycin/clindamycin here - doesn't need clindamycin, would switch to zosyn for now  -wound care per vascular  -continue statin    Non-insulin dependent type 2 diabetes  Hgb a1c 6.6% this admission. Takes metformin 500mg bid, Januvia 50mg qAM at home  -FSBG qac/qhs with SSI  -BGs at goal    On Eliquis  Unclear indication; continue for now, primary team calling to find out indication    Essential hypertension - continue home losartan 25mg once daily  GERD - continue home PPI    Dispo: pending wound care/improvement

## 2021-08-19 NOTE — CONSULT NOTE ADULT - SUBJECTIVE AND OBJECTIVE BOX
CC: LLE ulcer    HPI:  Per admission H&P:  "75 yo female w/ pmh of htn, dm, pvd who presented to the ED today for infected venous ulcer of the LLE. She had this ulcer for a year and reported that it has been this hot, red and tender for a month now. She follows with Dr. Frye. She also reported that she went to [facility] on Monday where they started her on Antibiotics. She was able to get her antibiotics from the pharmacy and get her first dose yesterday. She reported that her ulcer is very painful that she can barely walk. She reported chills but denies any fever."    Patient admitted to vascular surgery service, started on vancomycin and clindamycin. Seen by me this morning, confirms above history. Says foot is more painful this morning after wound care earlier today. Denies fever, chills, purulent drainage from foot. Antibiotics she was given at OSH were Bactrim and Unasyn.    12 point ROS reviewed and negative except as otherwise stated in HPI and below    PAST MEDICAL & SURGICAL HISTORY:  HTN (hypertension)    PVD (peripheral vascular disease)    DM (diabetes mellitus)    No significant past surgical history      SOCIAL HISTORY:  Tobacco: denies  Alcohol: denies  Illicit Drugs: denies  Living situation: with daughter  ADLs: had HHA 5 days a week    FAMILY HISTORY:  three sisters with DM, one  of uterine cancer  One brother with DM  Youngest brother had multiple MIs    ALLERGIES:  ampicillin (Hives)  Diovan (Anaphylaxis)  tetracycline (Anaphylaxis)      HOME MEDICATIONS:  atorvastatin:   Calcium 500+D:   Eliquis: 5 milligram(s) orally  Januvia: 50 milligram(s) orally  losartan: 25 milligram(s) orally  metFORMIN 500 mg oral tablet:   omeprazole: 20 milligram(s) orally      Vital Signs Last 24 Hrs  T(F): 98.1 (19 Aug 2021 09:15), Max: 98.4 (18 Aug 2021 20:28)  HR: 79 (19 Aug 2021 06:04) (79 - 93)  BP: 173/71 (19 Aug 2021 06:04) (142/67 - 173/71)  RR: 16 (19 Aug 2021 06:04) (16 - 18)  SpO2: 96% (19 Aug 2021 06:04) (94% - 96%)    PHYSICAL EXAM:  GENERAL: pleasant, appropriate, no acute distress, well-groomed, well-developed  HEAD:  Atraumatic, Normocephalic  EYES: EOMI, PERRLA, conjunctiva and sclera clear  ENMT: No tonsillar erythema, exudates, or enlargement; Moist mucous membranes, Good dentition  NECK: Supple, No JVD  CHEST/LUNG: Clear to auscultation bilaterally; No rales, rhonchi, wheezing, or rubs  HEART: Regular rate and rhythm; S1/S2, No murmurs, rubs, or gallops  ABDOMEN: Soft, Nontender, Nondistended; Bowel sounds present  VASCULAR: Normal pulses, Normal capillary refill  EXTREMITIES:  2+ Peripheral Pulses, No cyanosis, No edema  LYMPH: No lymphadenopathy noted  SKIN: Warm, Intact. L foot wrapped in ACE without saturation of dressing  PSYCH: Normal mood and affect  NERVOUS SYSTEM:  A/O x3, CN 2-12 intact, No focal deficits    LABS:                        10.7   9.11  )-----------( 153      ( 19 Aug 2021 06:02 )             34.2     -    137  |  102  |  16  ----------------------------<  94  4.2   |  23  |  0.57    Ca    9.7      19 Aug 2021 06:02  Phos  3.4     -  Mg     1.6         TPro  7.7  /  Alb  3.9  /  TBili  0.3  /  DBili  x   /  AST  12  /  ALT  6   /  AlkPhos  90  -18      Urinalysis Basic - ( 18 Aug 2021 17:02 )    Color: Yellow / Appearance: Clear / S.010 / pH: x  Gluc: x / Ketone: NEGATIVE  / Bili: Negative / Urobili: 0.2 E.U./dL   Blood: x / Protein: NEGATIVE mg/dL / Nitrite: NEGATIVE   Leuk Esterase: NEGATIVE / RBC: x / WBC x   Sq Epi: x / Non Sq Epi: x / Bacteria: x        Culture - Blood (collected 18 Aug 2021 18:20)  Source: .Blood Blood-Peripheral  Preliminary Report (19 Aug 2021 07:00):    No growth at 12 hours    Culture - Blood (collected 18 Aug 2021 18:20)  Source: .Blood Blood-Peripheral  Preliminary Report (19 Aug 2021 07:00):    No growth at 12 hours    Culture - Urine (collected 18 Aug 2021 18:14)  Source: Clean Catch Clean Catch (Midstream)  Preliminary Report (19 Aug 2021 08:05):    No growth to date      COVID-19 PCR: NotDetec (21 @ 17:42)    RADIOLOGY & ADDITIONAL TESTS:  < from: Xray Foot AP + Lateral + Oblique, Left (21 @ 16:18) >  FINDINGS:    Frontal, lateral and oblique views of the left foot and left ankle shows no evidence for acute fracture, subluxation or dislocation. Osseous structures are osteopenic. Mild degenerativechanges of the ankle mortise. Small heel spurs. Mild degenerative changes of the toes and midfoot. Moderate diffuse soft tissue swelling surrounding the ankle and foot. No evidence of osteomyelitis. Consider MRI as clinically warranted.    IMPRESSION:  Osteopenia. Osteoporosis. Moderate diffuse soft tissue swelling.    < end of copied text >

## 2021-08-19 NOTE — PROGRESS NOTE ADULT - SUBJECTIVE AND OBJECTIVE BOX
O/N: IOANA, VSS; pt started on vanc/clinda this AM (allergic to zosyn)    Subjective: patient seen and examined at bedside this morning. complaining of left lower leg pain; pain medication given and dressing was changed; denies cp/sob/f/c/n/v     clindamycin IVPB 300  vancomycin  IVPB 1000  apixaban 5  clindamycin IVPB 300  losartan 25  vancomycin  IVPB 1000      Allergies    ampicillin (Hives)  Diovan (Anaphylaxis)  tetracycline (Anaphylaxis)    Intolerances        Vital Signs Last 24 Hrs  T(C): 36.6 (19 Aug 2021 06:07), Max: 36.9 (18 Aug 2021 20:28)  T(F): 97.8 (19 Aug 2021 06:07), Max: 98.4 (18 Aug 2021 20:28)  HR: 79 (19 Aug 2021 06:04) (79 - 93)  BP: 173/71 (19 Aug 2021 06:04) (142/67 - 173/71)  BP(mean): --  RR: 16 (19 Aug 2021 06:04) (16 - 18)  SpO2: 96% (19 Aug 2021 06:04) (94% - 96%)  I&O's Summary      Physical Exam:  GENERAL: NAD, speaks in full sentences, no signs of respiratory distress  Pulmonary: no signs of respiratory distress, no accessory muscles used, symmetrical chest wall rise   Cardiovascular: RRR, no murmurs  Abdomen: Soft, Nontender, Nondistended; No guarding  Extremities: + ulcer over the medial mal of L foot notable for dried blood/dried scab; + erythema on edges of ulcer with + edema through LLE  Psych: AAOx3  Neurology: non-focal      LABS:                        10.7   9.11  )-----------( 153      ( 19 Aug 2021 06:02 )             34.2     08-18    142  |  104  |  18  ----------------------------<  91  4.7   |  28  |  0.66    Ca    10.3      18 Aug 2021 17:25  Phos  3.4     08-19  Mg     1.6     08-19    TPro  7.7  /  Alb  3.9  /  TBili  0.3  /  DBili  x   /  AST  12  /  ALT  6<L>  /  AlkPhos  90  08-18        Radiology and Additional Studies:    ---------------------------------------------------------------------------  PLEASE CHECK WHEN PRESENT:     [  ] Heart Failure     [  ] Acute     [  ] Acute on Chronic     [  ] Chronic  -------------------------------------------------------------------     [  ]Diastolic [HFpEF]     [  ]Systolic [HFrEF]     [  ]Combined [HFpEF & HFrEF]     [  ] afib     [  ] hypertensive heart disease     [  ]Other:  -------------------------------------------------------------------  [ ] Respiratory failure  [ ] Acute cor pulmonale  [ ] Asthma/COPD Exacerbation  [ ] Pleural effusion  [ ] Aspiration pneumonia  -------------------------------------------------------------------  [  ]JAYRO     [  ]ATN     [  ]Reneal Medullary Necrosis     [  ]Renal Cortical Necrosis     [  ]Other Pathological Lesions:    [  ]CKD 1  [  ]CKD 2  [  ]CKD 3  [  ]CKD 4  [  ]CKD 5  [  ]Other  -------------------------------------------------------------------  [ x ]Diabetes  [x  ] Diabetic PVD Ulcer  [  ] Neuropathic ulcer to DM  [  ] Diabetes with Nephropathy  [  ] Osteomyelitis due to diabetes  --------------------------------------------------------------------  [  ]Malnutrition: See Nutrition Note  [  ]Cachexia  [  ]Other:   [  ]Supplement Ordered:  [  ]Morbid Obesity (BMI >=40]  ---------------------------------------------------------------------  [ ] Sepsis/severe sepsis/septic shock  [ ] UTI  [ ] Pneumonia  -----------------------------------------------------------------------  [ ] Acidosis/alkalosis  [ ] Fluid overload  [ ] Hypokalemia  [ ] Hyperkalemia  [ ] Hypomagnesemia  [ ] Hypophosphatemia  [ ] Hyperphosphatemia  ------------------------------------------------------------------------  [ ] Acute blood loss anemia  [ ] Post op blood loss anemia  [ ] Iron deficiency anemia  [ ] Anemia due to chronic disease  [ ] Hypercoagulable state  ----------------------------------------------------------------------  [ ] Cerebral infarction  [ ] Transient ischemia attack  [ ] Encephalopathy

## 2021-08-20 ENCOUNTER — TRANSCRIPTION ENCOUNTER (OUTPATIENT)
Age: 76
End: 2021-08-20

## 2021-08-20 LAB
CULTURE RESULTS: SIGNIFICANT CHANGE UP
GLUCOSE BLDC GLUCOMTR-MCNC: 111 MG/DL — HIGH (ref 70–99)
GLUCOSE BLDC GLUCOMTR-MCNC: 114 MG/DL — HIGH (ref 70–99)
GLUCOSE BLDC GLUCOMTR-MCNC: 135 MG/DL — HIGH (ref 70–99)
GLUCOSE BLDC GLUCOMTR-MCNC: 173 MG/DL — HIGH (ref 70–99)
SPECIMEN SOURCE: SIGNIFICANT CHANGE UP

## 2021-08-20 PROCEDURE — 99231 SBSQ HOSP IP/OBS SF/LOW 25: CPT

## 2021-08-20 PROCEDURE — 99233 SBSQ HOSP IP/OBS HIGH 50: CPT

## 2021-08-20 PROCEDURE — 99233 SBSQ HOSP IP/OBS HIGH 50: CPT | Mod: GC

## 2021-08-20 RX ORDER — MOXIFLOXACIN HYDROCHLORIDE TABLETS, 400 MG 400 MG/1
1 TABLET, FILM COATED ORAL
Qty: 14 | Refills: 0
Start: 2021-08-20 | End: 2021-08-26

## 2021-08-20 RX ORDER — CIPROFLOXACIN LACTATE 400MG/40ML
500 VIAL (ML) INTRAVENOUS EVERY 12 HOURS
Refills: 0 | Status: DISCONTINUED | OUTPATIENT
Start: 2021-08-20 | End: 2021-08-24

## 2021-08-20 RX ORDER — MOXIFLOXACIN HYDROCHLORIDE TABLETS, 400 MG 400 MG/1
1 TABLET, FILM COATED ORAL
Qty: 28 | Refills: 0
Start: 2021-08-20 | End: 2021-09-02

## 2021-08-20 RX ORDER — AZTREONAM 2 G
2 VIAL (EA) INJECTION
Qty: 28 | Refills: 0
Start: 2021-08-20 | End: 2021-08-26

## 2021-08-20 RX ORDER — GABAPENTIN 400 MG/1
1 CAPSULE ORAL
Qty: 0 | Refills: 0 | DISCHARGE
Start: 2021-08-20

## 2021-08-20 RX ORDER — HYDROMORPHONE HYDROCHLORIDE 2 MG/ML
0.5 INJECTION INTRAMUSCULAR; INTRAVENOUS; SUBCUTANEOUS ONCE
Refills: 0 | Status: DISCONTINUED | OUTPATIENT
Start: 2021-08-20 | End: 2021-08-20

## 2021-08-20 RX ORDER — AZTREONAM 2 G
2 VIAL (EA) INJECTION
Qty: 56 | Refills: 0
Start: 2021-08-20 | End: 2021-09-02

## 2021-08-20 RX ORDER — ACETIC ACID
1 LIQUID (ML) MISCELLANEOUS
Qty: 0 | Refills: 0 | DISCHARGE
Start: 2021-08-20

## 2021-08-20 RX ADMIN — LOSARTAN POTASSIUM 25 MILLIGRAM(S): 100 TABLET, FILM COATED ORAL at 06:10

## 2021-08-20 RX ADMIN — MORPHINE SULFATE 1 MILLIGRAM(S): 50 CAPSULE, EXTENDED RELEASE ORAL at 15:35

## 2021-08-20 RX ADMIN — OXYCODONE HYDROCHLORIDE 5 MILLIGRAM(S): 5 TABLET ORAL at 10:24

## 2021-08-20 RX ADMIN — OXYCODONE HYDROCHLORIDE 5 MILLIGRAM(S): 5 TABLET ORAL at 04:37

## 2021-08-20 RX ADMIN — MORPHINE SULFATE 1 MILLIGRAM(S): 50 CAPSULE, EXTENDED RELEASE ORAL at 16:04

## 2021-08-20 RX ADMIN — Medication 650 MILLIGRAM(S): at 16:58

## 2021-08-20 RX ADMIN — PIPERACILLIN AND TAZOBACTAM 200 GRAM(S): 4; .5 INJECTION, POWDER, LYOPHILIZED, FOR SOLUTION INTRAVENOUS at 07:45

## 2021-08-20 RX ADMIN — GABAPENTIN 300 MILLIGRAM(S): 400 CAPSULE ORAL at 06:11

## 2021-08-20 RX ADMIN — APIXABAN 5 MILLIGRAM(S): 2.5 TABLET, FILM COATED ORAL at 17:54

## 2021-08-20 RX ADMIN — MORPHINE SULFATE 1 MILLIGRAM(S): 50 CAPSULE, EXTENDED RELEASE ORAL at 01:09

## 2021-08-20 RX ADMIN — Medication 650 MILLIGRAM(S): at 18:09

## 2021-08-20 RX ADMIN — APIXABAN 5 MILLIGRAM(S): 2.5 TABLET, FILM COATED ORAL at 10:24

## 2021-08-20 RX ADMIN — Medication 250 MILLIGRAM(S): at 10:25

## 2021-08-20 RX ADMIN — GABAPENTIN 300 MILLIGRAM(S): 400 CAPSULE ORAL at 21:53

## 2021-08-20 RX ADMIN — ATORVASTATIN CALCIUM 20 MILLIGRAM(S): 80 TABLET, FILM COATED ORAL at 21:53

## 2021-08-20 RX ADMIN — Medication 500 MILLIGRAM(S): at 17:54

## 2021-08-20 RX ADMIN — OXYCODONE HYDROCHLORIDE 5 MILLIGRAM(S): 5 TABLET ORAL at 05:30

## 2021-08-20 RX ADMIN — MORPHINE SULFATE 1 MILLIGRAM(S): 50 CAPSULE, EXTENDED RELEASE ORAL at 01:39

## 2021-08-20 RX ADMIN — HYDROMORPHONE HYDROCHLORIDE 0.5 MILLIGRAM(S): 2 INJECTION INTRAMUSCULAR; INTRAVENOUS; SUBCUTANEOUS at 06:11

## 2021-08-20 RX ADMIN — PIPERACILLIN AND TAZOBACTAM 200 GRAM(S): 4; .5 INJECTION, POWDER, LYOPHILIZED, FOR SOLUTION INTRAVENOUS at 00:24

## 2021-08-20 RX ADMIN — Medication 1 APPLICATION(S): at 10:27

## 2021-08-20 RX ADMIN — PANTOPRAZOLE SODIUM 40 MILLIGRAM(S): 20 TABLET, DELAYED RELEASE ORAL at 06:11

## 2021-08-20 RX ADMIN — PIPERACILLIN AND TAZOBACTAM 200 GRAM(S): 4; .5 INJECTION, POWDER, LYOPHILIZED, FOR SOLUTION INTRAVENOUS at 12:55

## 2021-08-20 RX ADMIN — GABAPENTIN 300 MILLIGRAM(S): 400 CAPSULE ORAL at 15:58

## 2021-08-20 RX ADMIN — Medication 2: at 16:58

## 2021-08-20 RX ADMIN — HYDROMORPHONE HYDROCHLORIDE 0.5 MILLIGRAM(S): 2 INJECTION INTRAMUSCULAR; INTRAVENOUS; SUBCUTANEOUS at 07:00

## 2021-08-20 RX ADMIN — OXYCODONE HYDROCHLORIDE 5 MILLIGRAM(S): 5 TABLET ORAL at 11:30

## 2021-08-20 RX ADMIN — Medication 1 APPLICATION(S): at 18:05

## 2021-08-20 RX ADMIN — Medication 2 TABLET(S): at 17:54

## 2021-08-20 NOTE — PHYSICAL THERAPY INITIAL EVALUATION ADULT - PERTINENT HX OF CURRENT PROBLEM, REHAB EVAL
76YOF with history of non-insulin dependent type 2 diabetes (a1c 6.6% this admission), essential hypertension, GERD, PAD, on Eliquis, admitted for infected diabetic foot ulcer of LLE.

## 2021-08-20 NOTE — PROGRESS NOTE ADULT - SUBJECTIVE AND OBJECTIVE BOX
O/N: KIKA TRIPLETT                                    ---------------------------------------------------------------------------  PLEASE CHECK WHEN PRESENT:     [  ] Heart Failure     [  ] Acute     [  ] Acute on Chronic     [  ] Chronic  -------------------------------------------------------------------     [  ]Diastolic [HFpEF]     [  ]Systolic [HFrEF]     [  ]Combined [HFpEF & HFrEF]     [  ] afib     [  ] hypertensive heart disease     [  ]Other:  -------------------------------------------------------------------  [ ] Respiratory failure  [ ] Acute cor pulmonale  [ ] Asthma/COPD Exacerbation  [ ] Pleural effusion  [ ] Aspiration pneumonia  -------------------------------------------------------------------  [  ]JAYRO     [  ]ATN     [  ]Reneal Medullary Necrosis     [  ]Renal Cortical Necrosis     [  ]Other Pathological Lesions:    [  ]CKD 1  [  ]CKD 2  [  ]CKD 3  [  ]CKD 4  [  ]CKD 5  [  ]Other  -------------------------------------------------------------------  [ x ]Diabetes  [x  ] Diabetic PVD Ulcer  [  ] Neuropathic ulcer to DM  [  ] Diabetes with Nephropathy  [  ] Osteomyelitis due to diabetes  --------------------------------------------------------------------  [  ]Malnutrition: See Nutrition Note  [  ]Cachexia  [  ]Other:   [  ]Supplement Ordered:  [  ]Morbid Obesity (BMI >=40]  ---------------------------------------------------------------------  [ ] Sepsis/severe sepsis/septic shock  [ ] UTI  [ ] Pneumonia  -----------------------------------------------------------------------  [ ] Acidosis/alkalosis  [ ] Fluid overload  [ ] Hypokalemia  [ ] Hyperkalemia  [ ] Hypomagnesemia  [ ] Hypophosphatemia  [ ] Hyperphosphatemia  ------------------------------------------------------------------------  [ ] Acute blood loss anemia  [ ] Post op blood loss anemia  [ ] Iron deficiency anemia  [ ] Anemia due to chronic disease  [ ] Hypercoagulable state  ----------------------------------------------------------------------  [ ] Cerebral infarction  [ ] Transient ischemia attack  [ ] Encephalopathy    Assessment and Plan:   Nutritional Assessment:  · Nutritional Assessment	76 F, PMH HTN, DM, PVD with non healing venous ulcer in LLE who presents to  ED on 8/18 due to infected venous ulcer of the LLE     Plan   #venous ulcer of LLE   - wet to dry dressing changes w/ acetic acid bid   - pain control   - f/u AM labs  - clarify reason patient is on eliquis, will call pharmacy today to confirm   - increase gabapentin dose to TID due to +TTP during dressing change this AM   - VTE: no sqh since pt is on eliquis  - diet: regular  - ivf: none  - diabetic regimen: iss   O/N: IOANA, VSS        piperacillin/tazobactam IVPB.. 3.375  vancomycin  IVPB 1000  apixaban 5  losartan 25  piperacillin/tazobactam IVPB.. 3.375  vancomycin  IVPB 1000      Allergies    ampicillin (Hives)  Diovan (Anaphylaxis)  tetracycline (Anaphylaxis)    Intolerances        Vital Signs Last 24 Hrs  T(C): 37.3 (20 Aug 2021 06:21), Max: 37.3 (20 Aug 2021 06:21)  T(F): 99.2 (20 Aug 2021 06:21), Max: 99.2 (20 Aug 2021 06:21)  HR: 90 (20 Aug 2021 04:41) (85 - 90)  BP: 145/67 (20 Aug 2021 04:41) (131/65 - 170/78)  BP(mean): --  RR: 18 (20 Aug 2021 04:41) (17 - 18)  SpO2: 96% (20 Aug 2021 04:41) (95% - 96%)  I&O's Summary    19 Aug 2021 07:01  -  20 Aug 2021 06:50  --------------------------------------------------------  IN: 1680 mL / OUT: 2350 mL / NET: -670 mL          LABS:             Physical Exam:  GENERAL: NAD  Pulmonary: no respiratory distress  Cardiovascular: RRR, no murmurs  Abdomen: Soft, Nontender, Nondistended; No guarding  Extremities: + ulcer over the medial mal of L foot notable for mixed pink tissue and some fibrinous exudate; + improving erythema on edges of ulcer with + edema through LLE                   10.7   9.11  )-----------( 153      ( 19 Aug 2021 06:02 )             34.2     08-19    137  |  102  |  16  ----------------------------<  94  4.2   |  23  |  0.57    Ca    9.7      19 Aug 2021 06:02  Phos  3.4     08-19  Mg     1.6     08-19    TPro  7.7  /  Alb  3.9  /  TBili  0.3  /  DBili  x   /  AST  12  /  ALT  6<L>  /  AlkPhos  90  08-18              ---------------------------------------------------------------------------  PLEASE CHECK WHEN PRESENT:     [  ] Heart Failure     [  ] Acute     [  ] Acute on Chronic     [  ] Chronic  -------------------------------------------------------------------     [  ]Diastolic [HFpEF]     [  ]Systolic [HFrEF]     [  ]Combined [HFpEF & HFrEF]     [  ] afib     [  ] hypertensive heart disease     [  ]Other:  -------------------------------------------------------------------  [ ] Respiratory failure  [ ] Acute cor pulmonale  [ ] Asthma/COPD Exacerbation  [ ] Pleural effusion  [ ] Aspiration pneumonia  -------------------------------------------------------------------  [  ]JAYRO     [  ]ATN     [  ]Reneal Medullary Necrosis     [  ]Renal Cortical Necrosis     [  ]Other Pathological Lesions:    [  ]CKD 1  [  ]CKD 2  [  ]CKD 3  [  ]CKD 4  [  ]CKD 5  [  ]Other  -------------------------------------------------------------------  [ x ]Diabetes  [x  ] Diabetic PVD Ulcer  [  ] Neuropathic ulcer to DM  [  ] Diabetes with Nephropathy  [  ] Osteomyelitis due to diabetes  --------------------------------------------------------------------  [  ]Malnutrition: See Nutrition Note  [  ]Cachexia  [  ]Other:   [  ]Supplement Ordered:  [  ]Morbid Obesity (BMI >=40]  ---------------------------------------------------------------------  [ ] Sepsis/severe sepsis/septic shock  [ ] UTI  [ ] Pneumonia  -----------------------------------------------------------------------  [ ] Acidosis/alkalosis  [ ] Fluid overload  [ ] Hypokalemia  [ ] Hyperkalemia  [ ] Hypomagnesemia  [ ] Hypophosphatemia  [ ] Hyperphosphatemia  ------------------------------------------------------------------------  [ ] Acute blood loss anemia  [ ] Post op blood loss anemia  [ ] Iron deficiency anemia  [ ] Anemia due to chronic disease  [ ] Hypercoagulable state  ----------------------------------------------------------------------  [ ] Cerebral infarction  [ ] Transient ischemia attack  [ ] Encephalopathy    Assessment and Plan:     76 F, PMH HTN, DM, PVD with non healing venous ulcer in LLE who presents to  ED on 8/18 due to infected venous ulcer of the LLE     Plan     #venous stasis ulcer of LLE  - Vanco and Zosyn  - wet to dry dressing changes w/ acetic acid bid   - increased gabapentin dose to TID for better pain control    #DM  -ISS  -Hgb a1c 6.6% this admission. Takes metformin 500mg bid, Januvia 50mg qAM at home  -Consistant carb diet     #HTN  - continue home losartan 25mg once daily    #GERD   - continue home PPI    #DVT ppx: on Eliquis at home     f/u AM labs  Diet: regular  Dispo: PT isaac today

## 2021-08-20 NOTE — PHYSICAL THERAPY INITIAL EVALUATION ADULT - ADDITIONAL COMMENTS
Pt states she lives alone in elevator building. Pt has HHA 3 days a week (hours?). Pt uses [facility] for ambulation denies other DME.

## 2021-08-20 NOTE — DISCHARGE NOTE PROVIDER - NSDCHC_MEDRECSTATUS_GEN_ALL_CORE
Admission Reconciliation is Completed  Discharge Reconciliation is Not Complete Admission Reconciliation is Completed  Discharge Reconciliation is Completed Admission Reconciliation is Not Complete  Discharge Reconciliation is Not Complete

## 2021-08-20 NOTE — DISCHARGE NOTE PROVIDER - HOSPITAL COURSE
77 yo F who was admitted due to infected venous ulcer of the LLE after presenting to the ED on August 18th. The patient was started on vancomycin and zosyn to treat the infected ulcer. She also received wet to dry dressing changes with acetic acid twice a day to further help improve wound healing. The patient tolerated her medications and dressing changes well. During the patient's hospital course, pain was controlled with IV and then PO pain medications.    At the time of discharge, the patient was hemodynamically stable, was tolerating PO diet, was voiding urine, was ambulating, and was comfortable with adequate pain control. The patient was instructed to follow up with Dr. Frye within 1 week after discharge from the hospital. The patient felt comfortable with the discharge. The patient had no other issues. 77 yo F who was admitted due to infected venous ulcer of the LLE after presenting to the ED on August 18th. The patient was started on vancomycin and zosyn to treat the infected ulcer. She also received wet to dry dressing changes with acetic acid twice a day to further help improve wound healing. The patient tolerated her medications and dressing changes well. During the patient's hospital course, pain was controlled with IV and then PO pain medications.    PT recommended KAREN for the patient. At the time of discharge, the patient was hemodynamically stable, was tolerating PO diet, was voiding urine, was ambulating, and was comfortable with adequate pain control. The patient was instructed to follow up with Dr. Frye within 1 week after discharge from the hospital. The patient felt comfortable with the discharge. The patient had no other issues. 77 yo F who was admitted due to infected venous ulcer of the LLE after presenting to the ED on August 18th. The patient was started on vancomycin and zosyn to treat the infected ulcer. She also received wet to dry dressing changes with acetic acid twice a day to further help improve wound healing. The patient tolerated her medications and dressing changes well. During the patient's hospital course, pain was controlled with IV and then PO pain medications.    PT recommended KAREN for the patient. At the time of discharge, the patient was hemodynamically stable, was tolerating PO diet, was voiding urine, was ambulating, and was comfortable with adequate pain control. The patient was instructed to follow up with Dr. Frye within 1 week after discharge from the hospital. A prescription for pain medication was sent to [facility]. The patient felt comfortable with the discharge. The patient had no other issues. 77 yo F who was admitted due to infected venous ulcer of the LLE after presenting to the ED on August 18th. The patient was started on vancomycin and zosyn to treat the infected ulcer. She also received wet to dry dressing changes with acetic acid twice a day to further help improve wound healing. The patient tolerated her medications and dressing changes well. During the patient's hospital course, pain was controlled with IV and then PO pain medications.    PT recommended KAREN for the patient, however she chose to go home with home health aid and assistance from her daughter. At the time of discharge, the patient was hemodynamically stable, was tolerating PO diet, was voiding urine, was ambulating, and was comfortable with adequate pain control. The patient was instructed to follow up with Dr. Frye within 1 week after discharge from the hospital. A prescription for pain medication was sent to [facility]. The patient felt comfortable with the discharge. The patient had no other issues. 77 yo F who was admitted due to infected venous ulcer of the LLE after presenting to the ED on August 18th. The patient was started on vancomycin and zosyn to treat the infected ulcer. She also received wet to dry dressing changes with acetic acid twice a day to further help improve wound healing. The patient tolerated her medications and dressing changes well. During the patient's hospital course, pain was controlled with IV and then PO pain medications.    PT recommended KAREN. At the time of discharge, the patient was hemodynamically stable, was tolerating PO diet, was voiding urine, was ambulating, and was comfortable with adequate pain control. The patient was instructed to follow up with Dr. Frye within 1 week after discharge from the hospital. A prescription for pain medication was sent to [facility]. The patient felt comfortable with the discharge. The patient had no other issues.

## 2021-08-20 NOTE — DISCHARGE NOTE PROVIDER - NSDCHHNEEDSERVICE_GEN_ALL_CORE
Wound care and assessment Observation and assessment/Wound care and assessment Observation and assessment/Rehabilitation services/Teaching and training/Wound care and assessment

## 2021-08-20 NOTE — DISCHARGE NOTE PROVIDER - NSDCHHHOMEBOUNDOTHER_GEN_ALL_CORE_FT
infected venous ulcer on the medial and lateral aspect of her LLE, treated with antibiotics  infected venous ulcer on the medial and lateral aspect of her LLE, treated with antibiotics , requires wet to dry dressing with acetic acid twice a day

## 2021-08-20 NOTE — PROGRESS NOTE ADULT - SUBJECTIVE AND OBJECTIVE BOX
Subjective/Interval events:  No acute overnight events. Sleeping this morning, awakens to voice. Reports ongoing pain in foot ulcer. No fever/chills. Tolerating diet.    MEDICATIONS  (STANDING):  acetic acid 0.25% Topical Irrigation 1 Application(s) Topical two times a day  apixaban 5 milliGRAM(s) Oral two times a day  atorvastatin 20 milliGRAM(s) Oral at bedtime  dextrose 40% Gel 15 Gram(s) Oral once  dextrose 5%. 1000 milliLiter(s) (50 mL/Hr) IV Continuous <Continuous>  dextrose 50% Injectable 25 Gram(s) IV Push once  gabapentin 300 milliGRAM(s) Oral three times a day  glucagon  Injectable 1 milliGRAM(s) IntraMuscular once  insulin lispro (ADMELOG) corrective regimen sliding scale   SubCutaneous Before meals and at bedtime  losartan 25 milliGRAM(s) Oral daily  pantoprazole    Tablet 40 milliGRAM(s) Oral before breakfast  piperacillin/tazobactam IVPB.. 3.375 Gram(s) IV Intermittent every 6 hours  vancomycin  IVPB 1000 milliGRAM(s) IV Intermittent every 12 hours    MEDICATIONS  (PRN):  acetaminophen   Tablet .. 650 milliGRAM(s) Oral every 6 hours PRN Mild Pain (1 - 3)  morphine  - Injectable 1 milliGRAM(s) IV Push every 4 hours PRN Severe Pain (7 - 10)  oxyCODONE    IR 5 milliGRAM(s) Oral every 4 hours PRN Moderate Pain (4 - 6)      Vital Signs Last 24 Hrs  T(C): 37.3 (20 Aug 2021 06:21), Max: 37.3 (20 Aug 2021 06:21)  T(F): 99.2 (20 Aug 2021 06:21), Max: 99.2 (20 Aug 2021 06:21)  HR: 91 (20 Aug 2021 06:08) (85 - 91)  BP: 130/64 (20 Aug 2021 06:08) (130/64 - 145/67)  BP(mean): --  RR: 18 (20 Aug 2021 06:08) (17 - 18)  SpO2: 96% (20 Aug 2021 06:08) (95% - 96%)    PHYSICAL EXAM:  GENERAL: pleasant, appropriate, no acute distress. Participating appropriately in interview  HEENT - NC/AT, EOMI, PERLLA, oropharynx clear without exudate or erythema, moist mucus membranes  NECK: Soft, supple, No JVD, no lymphadenopathy  CHEST/LUNG: Clear to auscultation bilaterally; No rales, rhonchi, wheezing. Normal work of breathing, not tachypneic  HEART: Regular rate and rhythm; No murmurs, rubs, or gallops, normal s1/s2. Warm and well-perfused  ABDOMEN: Soft, Nontender, Nondistended. Normoactive bowel sounds.  EXTREMITIES:  2+ Peripheral Pulses, No clubbing, cyanosis, or edema  NEURO:  awake, alert, oriented to person, place, time, and situation. Cranial nerves intact, no motor or sensory deficits. Moves all extremities.  SKIN: LLE wrapped in ACE, c/d/i    LABS:  08-19    137  |  102  |  16  ----------------------------<  94  4.2   |  23  |  0.57    Ca    9.7      19 Aug 2021 06:02  Phos  3.4     08-19  Mg     1.6     08-19    TPro  7.7  /  Alb  3.9  /  TBili  0.3  /  DBili  x   /  AST  12  /  ALT  6<L>  /  AlkPhos  90  08-18                          10.7   9.11  )-----------( 153      ( 19 Aug 2021 06:02 )             34.2     RADIOLOGY & ADDITIONAL TESTS:  reviewed, none new

## 2021-08-20 NOTE — DISCHARGE NOTE PROVIDER - NSDCCPCAREPLAN_GEN_ALL_CORE_FT
PRINCIPAL DISCHARGE DIAGNOSIS  Diagnosis: Diabetic ulcer of left foot  Assessment and Plan of Treatment:       SECONDARY DISCHARGE DIAGNOSES  Diagnosis: HTN (hypertension)  Assessment and Plan of Treatment:     Diagnosis: Diabetes  Assessment and Plan of Treatment:     Diagnosis: Peripheral vascular disease  Assessment and Plan of Treatment:

## 2021-08-20 NOTE — DISCHARGE NOTE PROVIDER - DETAILS OF MALNUTRITION DIAGNOSIS/DIAGNOSES
This patient has been assessed with a concern for Malnutrition and was treated during this hospitalization for the following Nutrition diagnosis/diagnoses:     -  08/23/2021: Moderate protein-calorie malnutrition

## 2021-08-20 NOTE — PROGRESS NOTE ADULT - ASSESSMENT
76YOF with history of non-insulin dependent type 2 diabetes (a1c 6.6% this admission), essential hypertension, GERD, PAD, on Eliquis, admitted for infected diabetic foot ulcer of LLE.    Diabetic foot infection  LLE diabetic ulcer  PAD  Has had ulcer for about a year but worsening pain/erythema over one month. Treated with Bactrim/Augmentin at OSH.   -on vancomycin/zosyn for now  -wound care per vascular  -continue statin    Recent fall  Patient reports fall at home last week, no injuries, has been unsteady on feet.  -PT eval    Non-insulin dependent type 2 diabetes  Hgb a1c 6.6% this admission. Takes metformin 500mg bid, Januvia 50mg qAM at home  -FSBG qac/qhs with SSI  -BGs mostkly at goal    On Eliquis  Unclear indication; unable to obtain from pharmacy and patient, primary team unable to reach outpatient provider. Continue for now    Essential hypertension - continue home losartan 25mg once daily  GERD - continue home PPI    Dispo: pending wound care/improvement. PT ordered

## 2021-08-20 NOTE — DISCHARGE NOTE PROVIDER - NSDCMRMEDTOKEN_GEN_ALL_CORE_FT
acetic acid 0.25% irrigation solution: 1 application irrigation 2 times a day  atorvastatin:   Calcium 500+D:   Eliquis: 5 milligram(s) orally  gabapentin 300 mg oral capsule: 1 cap(s) orally 3 times a day  Januvia: 50 milligram(s) orally  losartan: 25 milligram(s) orally  metFORMIN 500 mg oral tablet:   omeprazole: 20 milligram(s) orally   acetic acid 0.25% irrigation solution: 1 application irrigation 2 times a day  atorvastatin:   Bactrim  mg-160 mg oral tablet: 2 tab(s) orally 2 times a day   Calcium 500+D:   Cipro 500 mg oral tablet: 1 tab(s) orally 2 times a day   Eliquis: 5 milligram(s) orally  gabapentin 300 mg oral capsule: 1 cap(s) orally 3 times a day  Januvia: 50 milligram(s) orally  losartan: 25 milligram(s) orally  metFORMIN 500 mg oral tablet:   omeprazole: 20 milligram(s) orally   acetic acid 0.25% irrigation solution: 1 application irrigation 2 times a day  atorvastatin:   Bactrim  mg-160 mg oral tablet: 2 tab(s) orally 2 times a day   Calcium 500+D:   Cipro 500 mg oral tablet: 1 tab(s) orally 2 times a day   Eliquis: 5 milligram(s) orally  gabapentin 300 mg oral capsule: 1 cap(s) orally 3 times a day  Januvia: 50 milligram(s) orally  losartan: 25 milligram(s) orally  metFORMIN 500 mg oral tablet:   omeprazole: 20 milligram(s) orally  oxyCODONE 5 mg oral tablet: 1 tab(s) orally every 4 to 6 hours MDD:6   acetic acid 0.25% irrigation solution: 1 application irrigation 2 times a day  atorvastatin:   Bactrim  mg-160 mg oral tablet: 2 tab(s) orally 2 times a day  Calcium 500+D:   Cipro 500 mg oral tablet: 1 tab(s) orally every 12 hours  Eliquis: 5 milligram(s) orally  gabapentin 300 mg oral capsule: 1 cap(s) orally 3 times a day  Januvia: 50 milligram(s) orally  losartan: 25 milligram(s) orally  metFORMIN 500 mg oral tablet:   omeprazole: 20 milligram(s) orally  oxyCODONE 5 mg oral tablet: 1 tab(s) orally every 4 to 6 hours MDD:6   atorvastatin:   Bactrim  mg-160 mg oral tablet: 2 tab(s) orally 2 times a day  Calcium 500+D:   Cipro 500 mg oral tablet: 1 tab(s) orally every 12 hours  Eliquis: 5 milligram(s) orally  gabapentin 300 mg oral capsule: 1 cap(s) orally 3 times a day  Januvia: 50 milligram(s) orally  losartan: 25 milligram(s) orally  metFORMIN 500 mg oral tablet:   omeprazole: 20 milligram(s) orally  oxyCODONE 5 mg oral tablet: 1 tab(s) orally every 4 to 6 hours MDD:6   acetaminophen 325 mg oral tablet: 2 tab(s) orally every 6 hours, As needed, Mild Pain (1 - 3)  atorvastatin:   Bactrim  mg-160 mg oral tablet: 2 tab(s) orally 2 times a day  Calcium 500+D:   Cipro 500 mg oral tablet: 1 tab(s) orally every 12 hours  Eliquis: 5 milligram(s) orally  gabapentin 300 mg oral capsule: 1 cap(s) orally 3 times a day  Januvia: 50 milligram(s) orally  losartan: 25 milligram(s) orally  metFORMIN 500 mg oral tablet:   omeprazole: 20 milligram(s) orally  Oxaydo 5 mg oral tablet: 1 tab(s) orally every 4 hours, As needed, Moderate Pain (4 - 6)  oxyCODONE 5 mg oral tablet: 1 tab(s) orally every 4 to 6 hours MDD:6

## 2021-08-20 NOTE — DISCHARGE NOTE PROVIDER - PROVIDER TOKENS
PROVIDER:[TOKEN:[[id number]]] PROVIDER:[TOKEN:[[id number]],SCHEDULEDAPPT:[08/27/2021],SCHEDULEDAPPTTIME:[01:45 PM]] PROVIDER:[TOKEN:[[id number]],SCHEDULEDAPPT:[08/30/2021],SCHEDULEDAPPTTIME:[01:30 PM]]

## 2021-08-20 NOTE — DISCHARGE NOTE PROVIDER - CARE PROVIDER_API CALL
Gary Frye)  Surgery; Vascular Surgery  [address] [place] [address]  Phone: [phone]  Fax: [phone]  Follow Up Time:    Gary Frye (MD)  Surgery; Vascular Surgery  [address], [address] [place] [address]  Phone: [phone]  Fax: [phone]  Scheduled Appointment: 08/27/2021 01:45 PM   Gary Frye (MD)  Surgery; Vascular Surgery  [address], [address] [place] [address]  Phone: [phone]  Fax: [phone]  Scheduled Appointment: 08/30/2021 01:30 PM

## 2021-08-20 NOTE — PHYSICAL THERAPY INITIAL EVALUATION ADULT - GENERAL OBSERVATIONS, REHAB EVAL
Pt received semi supine in bed +IV line +prima fit +tele +ace bandage on L LE C/D/I. PT received consent to treat from TAYLOR Diallo. Pt was left as received with call bell in reach, VSS, and in NAD. PT To follow up.

## 2021-08-20 NOTE — DISCHARGE NOTE PROVIDER - NSDCFUADDINST_GEN_ALL_CORE_FT
FOLLOW UP:Dr. Frye in 1 week. Your appointment has been made for _______. Call the office at  with any questions...WOUND CARE: You may shower; soap and water over incision sites. Do not scrub. Pat dry when done. Please wash the affected left leg wound with acetic acid twice a day. Wrap the leg with one layer of Kerlex then pour the acetic acid over the layer of Kerlex then subsequently wrap the rest of the Kerlex around the affected leg. ACTIVITY:Ambulate as tolerated, but no heavy lifting (>10lbs) or strenuous exercise....DIET:   You may resume regular diet. Call the office if you experience increasing pain, redness, swelling or drainage from incision sites/wounds, or temperature >101.4F. NEW MEDICATIONS:ADDITIONAL FOLLOW UP AFTER DISCHARGE:DISCHARGE DESTINATION:  FOLLOW UP: Dr. Frye in 1 week. Your appointment has been made for 8/27/21 at 1:45pm.   Call the office at  with any questions.  WOUND CARE: You may shower; soap and water over the wound. Do not scrub. Pat dry when done. Please wash the affected left leg wound with acetic acid twice a day. Wrap the leg with one layer of Kerlix then pour the acetic acid over the layer of Kerlix then subsequently wrap the rest of the Kerlix around the affected leg. ACTIVITY: Ambulate as tolerated, but no heavy lifting (>10lbs) or strenuous exercise.  DIET: You may resume regular diet.   Call the office if you experience increasing pain, redness, swelling or drainage from wounds, or temperature >101.4F.     NEW MEDICATIONS: Please complete full course of 1 week of ___________    DISCHARGE DESTINATION: Home FOLLOW UP: Dr. Frye in 1 week. Your appointment has been made for 8/27/21 at 1:45pm.   Call the office at  with any questions.  WOUND CARE: You may shower; soap and water over the wound. Do not scrub. Pat dry when done. Please wash the affected left leg wound with acetic acid twice a day. Wrap the leg with one layer of Kerlix then pour the acetic acid over the layer of Kerlix then subsequently wrap the rest of the Kerlix around the affected leg. ACTIVITY: Ambulate as tolerated, but no heavy lifting (>10lbs) or strenuous exercise.  DIET: You may resume regular diet.   Call the office if you experience increasing pain, redness, swelling or drainage from wounds, or temperature >101.4F.     NEW MEDICATIONS: Please complete full course of 1 week of Ciprofloxacin and Bactrim. Your prescriptions have been sent to [facility] located in the [facility] the [facility].    DISCHARGE DESTINATION: Home FOLLOW UP: Dr. Frye in 1 week. Your appointment has been made for 8/27/21 at 1:45pm. Call the office at  with any questions.    WOUND CARE: You may shower; soap and water over the wound. Do not scrub. Pat dry when done. Please wash the affected left leg wound with acetic acid twice a day. Wrap the leg with one layer of Kerlix then pour the acetic acid over the layer of Kerlix then subsequently wrap the rest of the Kerlix around the affected leg.     ACTIVITY: Ambulate as tolerated, but no heavy lifting (>10lbs) or strenuous exercise.    DIET: You may resume regular diet.     Call the office if you experience increasing pain, redness, swelling or drainage from wounds, or temperature >101.4F.     NEW MEDICATIONS: Please complete full course of 1 week of Ciprofloxacin and Bactrim. Your prescriptions have been sent to [facility] located in the [facility] the [facility].    DISCHARGE DESTINATION: Home FOLLOW UP: Dr. Frye in 1 week. Your appointment has been made for 8/27/21 at 1:45pm. Call the office at  with any questions.    WOUND CARE: You may shower; soap and water over the wound. Do not scrub. Pat dry when done. Please wash the affected left leg wound with acetic acid twice a day. Wrap the leg with one layer of Kerlix then pour the acetic acid over the layer of Kerlix then subsequently wrap the rest of the Kerlix around the affected leg.     ACTIVITY: Ambulate as tolerated, but no heavy lifting (>10lbs) or strenuous exercise.    DIET: You may resume regular diet.     Call the office if you experience increasing pain, redness, swelling or drainage from wounds, or temperature >101.4F.     NEW MEDICATIONS: Please complete full course of 1 week of Ciprofloxacin and Bactrim. Your antibiotics will be given at the rehab center    DISCHARGE DESTINATION: Home FOLLOW UP: Dr. Frye in 1 week. Your appointment has been made for 8/27/21 at 1:45pm. Call the office at  with any questions.    WOUND CARE: You may shower; soap and water over the wound. Do not scrub. Pat dry when done. Wrap the leg with one layer of Kerlix then pour the acetic acid over the layer of Kerlix then subsequently wrap the rest of the Kerlix around the affected leg. Then wrap ACE bandage around Kerlix.     ACTIVITY: Ambulate as tolerated, but no heavy lifting (>10lbs) or strenuous exercise.    DIET: You may resume regular diet.     Call the office if you experience increasing pain, redness, swelling or drainage from wounds, or temperature >101.4F.     NEW MEDICATIONS: Please complete full course of 1 week of Ciprofloxacin and Bactrim. Your antibiotics will be given at the rehab center    DISCHARGE DESTINATION: Home FOLLOW UP: Dr. Frye in 1 week. Your appointment has been made for 8/27/21 at 1:45pm. Call the office at  with any questions.    WOUND CARE: You may shower; soap and water over the wound. Do not scrub. Pat dry when done. Dampen gauze with saline solution. Place on or into wound. Cover with dry gauze and Kerlix wrap daily.     ACTIVITY: Ambulate as tolerated, but no heavy lifting (>10lbs) or strenuous exercise.    DIET: You may resume regular diet.     Call the office if you experience increasing pain, redness, swelling or drainage from wounds, or temperature >101.4F.     NEW MEDICATIONS: Please complete full course of 2 weeks of Ciprofloxacin and Bactrim. Your antibiotics will be given at the rehab center    DISCHARGE DESTINATION: KAREN FOLLOW UP: Dr. Frye in 1 week. Your appointment has been made for 8/27/21 at 1:45pm. Call the office at  with any questions.    WOUND CARE: You may shower; soap and water over the wound. Do not scrub. Pat dry when done. Put a layer of OTC moisturizer over affected leg wound. Dampen gauze with saline solution. Place on or into wound. Cover with dry gauze and Kerlix wrap daily.     ACTIVITY: Ambulate as tolerated, but no heavy lifting (>10lbs) or strenuous exercise.    DIET: You may resume regular diet.     Call the office if you experience increasing pain, redness, swelling or drainage from wounds, or temperature >101.4F.     NEW MEDICATIONS: Please complete full course of 2 weeks of Ciprofloxacin and Bactrim. Your antibiotics will be given at the rehab center    DISCHARGE DESTINATION: KAREN FOLLOW UP: Dr. Frye in 1 week. Your appointment has been made for 8/27/21 at 1:45pm. Call the office at  with any questions.    WOUND CARE: You may shower; soap and water over the wound. Do not scrub. Pat dry when done. Put a layer of OTC moisturizer over affected leg wound. Dampen gauze with saline solution. Place on or into wound. Cover with dry gauze and Kerlix wrap daily.     ACTIVITY: Ambulate as tolerated, but no heavy lifting (>10lbs) or strenuous exercise.    DIET: You may resume regular diet.     Call the office if you experience increasing pain, redness, swelling or drainage from wounds, or temperature >101.4F.     NEW MEDICATIONS: Please complete full course of 2 weeks of Ciprofloxacin and Bactrim. Your antibiotics will be given at the rehab center    DISCHARGE DESTINATION: Home FOLLOW UP: Dr. Frye in 1 week. Your appointment has been made for 8/30/21 at 1:30pm. Call the office at  with any questions.    WOUND CARE: You may shower; soap and water over the wound. Do not scrub. Pat dry when done. Put a layer of OTC moisturizer over affected leg wound. Dampen gauze with saline solution. Place on or into wound. Cover with dry gauze and Kerlix wrap daily.     ACTIVITY: Ambulate as tolerated, but no heavy lifting (>10lbs) or strenuous exercise.    DIET: You may resume regular diet.     Call the office if you experience increasing pain, redness, swelling or drainage from wounds, or temperature >101.4F.     NEW MEDICATIONS: Please complete full course of 2 weeks of Ciprofloxacin and Bactrim ending on 9/1/21. Your antibiotics will be given at the rehab center.    New medication dose: Gabapentin increased from 300mg twice a day to three times a day.    DISCHARGE DESTINATION: [facility]

## 2021-08-21 ENCOUNTER — TRANSCRIPTION ENCOUNTER (OUTPATIENT)
Age: 76
End: 2021-08-21

## 2021-08-21 LAB
ANION GAP SERPL CALC-SCNC: 11 MMOL/L — SIGNIFICANT CHANGE UP (ref 5–17)
BUN SERPL-MCNC: 17 MG/DL — SIGNIFICANT CHANGE UP (ref 7–23)
CALCIUM SERPL-MCNC: 9.8 MG/DL — SIGNIFICANT CHANGE UP (ref 8.4–10.5)
CHLORIDE SERPL-SCNC: 102 MMOL/L — SIGNIFICANT CHANGE UP (ref 96–108)
CO2 SERPL-SCNC: 24 MMOL/L — SIGNIFICANT CHANGE UP (ref 22–31)
CREAT SERPL-MCNC: 0.72 MG/DL — SIGNIFICANT CHANGE UP (ref 0.5–1.3)
GLUCOSE BLDC GLUCOMTR-MCNC: 107 MG/DL — HIGH (ref 70–99)
GLUCOSE BLDC GLUCOMTR-MCNC: 147 MG/DL — HIGH (ref 70–99)
GLUCOSE BLDC GLUCOMTR-MCNC: 184 MG/DL — HIGH (ref 70–99)
GLUCOSE BLDC GLUCOMTR-MCNC: 197 MG/DL — HIGH (ref 70–99)
GLUCOSE SERPL-MCNC: 118 MG/DL — HIGH (ref 70–99)
HCT VFR BLD CALC: 38 % — SIGNIFICANT CHANGE UP (ref 34.5–45)
HGB BLD-MCNC: 11.9 G/DL — SIGNIFICANT CHANGE UP (ref 11.5–15.5)
MAGNESIUM SERPL-MCNC: 1.8 MG/DL — SIGNIFICANT CHANGE UP (ref 1.6–2.6)
MCHC RBC-ENTMCNC: 28.5 PG — SIGNIFICANT CHANGE UP (ref 27–34)
MCHC RBC-ENTMCNC: 31.3 GM/DL — LOW (ref 32–36)
MCV RBC AUTO: 91.1 FL — SIGNIFICANT CHANGE UP (ref 80–100)
NRBC # BLD: 0 /100 WBCS — SIGNIFICANT CHANGE UP (ref 0–0)
PHOSPHATE SERPL-MCNC: 3.3 MG/DL — SIGNIFICANT CHANGE UP (ref 2.5–4.5)
PLATELET # BLD AUTO: 269 K/UL — SIGNIFICANT CHANGE UP (ref 150–400)
POTASSIUM SERPL-MCNC: 5 MMOL/L — SIGNIFICANT CHANGE UP (ref 3.5–5.3)
POTASSIUM SERPL-SCNC: 5 MMOL/L — SIGNIFICANT CHANGE UP (ref 3.5–5.3)
RBC # BLD: 4.17 M/UL — SIGNIFICANT CHANGE UP (ref 3.8–5.2)
RBC # FLD: 13.7 % — SIGNIFICANT CHANGE UP (ref 10.3–14.5)
SODIUM SERPL-SCNC: 137 MMOL/L — SIGNIFICANT CHANGE UP (ref 135–145)
WBC # BLD: 8.99 K/UL — SIGNIFICANT CHANGE UP (ref 3.8–10.5)
WBC # FLD AUTO: 8.99 K/UL — SIGNIFICANT CHANGE UP (ref 3.8–10.5)

## 2021-08-21 RX ORDER — MAGNESIUM SULFATE 500 MG/ML
1 VIAL (ML) INJECTION ONCE
Refills: 0 | Status: COMPLETED | OUTPATIENT
Start: 2021-08-21 | End: 2021-08-21

## 2021-08-21 RX ADMIN — APIXABAN 5 MILLIGRAM(S): 2.5 TABLET, FILM COATED ORAL at 06:09

## 2021-08-21 RX ADMIN — OXYCODONE HYDROCHLORIDE 5 MILLIGRAM(S): 5 TABLET ORAL at 11:23

## 2021-08-21 RX ADMIN — GABAPENTIN 300 MILLIGRAM(S): 400 CAPSULE ORAL at 13:28

## 2021-08-21 RX ADMIN — Medication 500 MILLIGRAM(S): at 18:32

## 2021-08-21 RX ADMIN — GABAPENTIN 300 MILLIGRAM(S): 400 CAPSULE ORAL at 06:09

## 2021-08-21 RX ADMIN — OXYCODONE HYDROCHLORIDE 5 MILLIGRAM(S): 5 TABLET ORAL at 12:17

## 2021-08-21 RX ADMIN — Medication 2 TABLET(S): at 18:32

## 2021-08-21 RX ADMIN — Medication 2 TABLET(S): at 06:09

## 2021-08-21 RX ADMIN — GABAPENTIN 300 MILLIGRAM(S): 400 CAPSULE ORAL at 21:10

## 2021-08-21 RX ADMIN — MORPHINE SULFATE 1 MILLIGRAM(S): 50 CAPSULE, EXTENDED RELEASE ORAL at 10:54

## 2021-08-21 RX ADMIN — Medication 500 MILLIGRAM(S): at 06:09

## 2021-08-21 RX ADMIN — Medication 100 GRAM(S): at 09:36

## 2021-08-21 RX ADMIN — MORPHINE SULFATE 1 MILLIGRAM(S): 50 CAPSULE, EXTENDED RELEASE ORAL at 11:11

## 2021-08-21 RX ADMIN — Medication 2: at 12:24

## 2021-08-21 RX ADMIN — MORPHINE SULFATE 1 MILLIGRAM(S): 50 CAPSULE, EXTENDED RELEASE ORAL at 06:23

## 2021-08-21 RX ADMIN — Medication 2: at 21:54

## 2021-08-21 RX ADMIN — PANTOPRAZOLE SODIUM 40 MILLIGRAM(S): 20 TABLET, DELAYED RELEASE ORAL at 06:09

## 2021-08-21 RX ADMIN — APIXABAN 5 MILLIGRAM(S): 2.5 TABLET, FILM COATED ORAL at 18:32

## 2021-08-21 RX ADMIN — MORPHINE SULFATE 1 MILLIGRAM(S): 50 CAPSULE, EXTENDED RELEASE ORAL at 06:08

## 2021-08-21 RX ADMIN — LOSARTAN POTASSIUM 25 MILLIGRAM(S): 100 TABLET, FILM COATED ORAL at 06:09

## 2021-08-21 RX ADMIN — ATORVASTATIN CALCIUM 20 MILLIGRAM(S): 80 TABLET, FILM COATED ORAL at 21:10

## 2021-08-21 RX ADMIN — Medication 1 APPLICATION(S): at 06:35

## 2021-08-21 NOTE — PROGRESS NOTE ADULT - SUBJECTIVE AND OBJECTIVE BOX
O/N: KIKA TRIPLETT                                      ---------------------------------------------------------------------------  PLEASE CHECK WHEN PRESENT:     [  ] Heart Failure     [  ] Acute     [  ] Acute on Chronic     [  ] Chronic  -------------------------------------------------------------------     [  ]Diastolic [HFpEF]     [  ]Systolic [HFrEF]     [  ]Combined [HFpEF & HFrEF]     [  ] afib     [  ] hypertensive heart disease     [  ]Other:  -------------------------------------------------------------------  [ ] Respiratory failure  [ ] Acute cor pulmonale  [ ] Asthma/COPD Exacerbation  [ ] Pleural effusion  [ ] Aspiration pneumonia  -------------------------------------------------------------------  [  ]JAYRO     [  ]ATN     [  ]Reneal Medullary Necrosis     [  ]Renal Cortical Necrosis     [  ]Other Pathological Lesions:    [  ]CKD 1  [  ]CKD 2  [  ]CKD 3  [  ]CKD 4  [  ]CKD 5  [  ]Other  -------------------------------------------------------------------  [ x ]Diabetes  [x  ] Diabetic PVD Ulcer  [  ] Neuropathic ulcer to DM  [  ] Diabetes with Nephropathy  [  ] Osteomyelitis due to diabetes  --------------------------------------------------------------------  [  ]Malnutrition: See Nutrition Note  [  ]Cachexia  [  ]Other:   [  ]Supplement Ordered:  [  ]Morbid Obesity (BMI >=40]  ---------------------------------------------------------------------  [ ] Sepsis/severe sepsis/septic shock  [ ] UTI  [ ] Pneumonia  -----------------------------------------------------------------------  [ ] Acidosis/alkalosis  [ ] Fluid overload  [ ] Hypokalemia  [ ] Hyperkalemia  [ ] Hypomagnesemia  [ ] Hypophosphatemia  [ ] Hyperphosphatemia  ------------------------------------------------------------------------  [ ] Acute blood loss anemia  [ ] Post op blood loss anemia  [ ] Iron deficiency anemia  [ ] Anemia due to chronic disease  [ ] Hypercoagulable state  ----------------------------------------------------------------------  [ ] Cerebral infarction  [ ] Transient ischemia attack  [ ] Encephalopathy    Assessment and Plan:     76 F, PMH HTN, DM, PVD with non healing venous ulcer in LLE who presents to  ED on 8/18 due to infected venous ulcer of the LLE     Plan     #venous stasis ulcer of LLE  - Vanco and Zosyn  - wet to dry dressing changes w/ acetic acid bid   - increased gabapentin dose to TID for better pain control    #DM  -ISS  -Hgb a1c 6.6% this admission. Takes metformin 500mg bid, Januvia 50mg qAM at home  -Consistant carb diet     #HTN  - continue home losartan 25mg once daily    #GERD   - continue home PPI    #DVT ppx: on Eliquis at home     f/u AM labs  Diet: regular  Dispo: PT eval today     O/N: IOANA, VSS    Subjective: Patient seen and examined at bedside. No acute events overnight.    STATUS POST:       SUBJECTIVE: Patient seen and examined bedside by chief resident.     apixaban 5 milliGRAM(s) Oral two times a day  ciprofloxacin     Tablet 500 milliGRAM(s) Oral every 12 hours  losartan 25 milliGRAM(s) Oral daily  trimethoprim  160 mG/sulfamethoxazole 800 mG 2 Tablet(s) Oral two times a day      Vital Signs Last 24 Hrs  T(C): 36.8 (21 Aug 2021 13:56), Max: 36.9 (20 Aug 2021 18:20)  T(F): 98.2 (21 Aug 2021 13:56), Max: 98.4 (20 Aug 2021 18:20)  HR: 93 (21 Aug 2021 12:26) (71 - 111)  BP: 103/57 (21 Aug 2021 12:26) (103/57 - 141/68)  BP(mean): --  RR: 18 (21 Aug 2021 12:26) (18 - 18)  SpO2: 97% (21 Aug 2021 12:26) (95% - 98%)  I&O's Detail    20 Aug 2021 07:01  -  21 Aug 2021 07:00  --------------------------------------------------------  IN:    Oral Fluid: 780 mL  Total IN: 780 mL    OUT:    Voided (mL): 1950 mL  Total OUT: 1950 mL    Total NET: -1170 mL      21 Aug 2021 07:01  -  21 Aug 2021 14:47  --------------------------------------------------------  IN:    Oral Fluid: 360 mL  Total IN: 360 mL    OUT:    Voided (mL): 700 mL  Total OUT: 700 mL    Total NET: -340 mL          Physical Exam:  GENERAL: NAD, lying comfortably in bed   Pulmonary: no respiratory distress, non labored breathing   Cardiovascular: RRR  Abdomen: Soft, NT/ND  Extremities:  ulcer over the left medial maleous notable for mixed pink tissue and some fibrinous exudate; improving erythema on edges of ulcer with, edema of LLE    LABS:                        11.9   8.99  )-----------( 269      ( 21 Aug 2021 07:34 )             38.0     08-21    137  |  102  |  17  ----------------------------<  118<H>  5.0   |  24  |  0.72    Ca    9.8      21 Aug 2021 07:34  Phos  3.3     08-21  Mg     1.8     08-21            RADIOLOGY & ADDITIONAL STUDIES:        ---------------------------------------------------------------------------  PLEASE CHECK WHEN PRESENT:     [  ] Heart Failure     [  ] Acute     [  ] Acute on Chronic     [  ] Chronic  -------------------------------------------------------------------     [  ]Diastolic [HFpEF]     [  ]Systolic [HFrEF]     [  ]Combined [HFpEF & HFrEF]     [  ] afib     [  ] hypertensive heart disease     [  ]Other:  -------------------------------------------------------------------  [ ] Respiratory failure  [ ] Acute cor pulmonale  [ ] Asthma/COPD Exacerbation  [ ] Pleural effusion  [ ] Aspiration pneumonia  -------------------------------------------------------------------  [  ]JAYRO     [  ]ATN     [  ]Reneal Medullary Necrosis     [  ]Renal Cortical Necrosis     [  ]Other Pathological Lesions:    [  ]CKD 1  [  ]CKD 2  [  ]CKD 3  [  ]CKD 4  [  ]CKD 5  [  ]Other  -------------------------------------------------------------------  [ x ]Diabetes  [x  ] Diabetic PVD Ulcer  [  ] Neuropathic ulcer to DM  [  ] Diabetes with Nephropathy  [  ] Osteomyelitis due to diabetes  --------------------------------------------------------------------  [  ]Malnutrition: See Nutrition Note  [  ]Cachexia  [  ]Other:   [  ]Supplement Ordered:  [  ]Morbid Obesity (BMI >=40]  ---------------------------------------------------------------------  [ ] Sepsis/severe sepsis/septic shock  [ ] UTI  [ ] Pneumonia  -----------------------------------------------------------------------  [ ] Acidosis/alkalosis  [ ] Fluid overload  [ ] Hypokalemia  [ ] Hyperkalemia  [ ] Hypomagnesemia  [ ] Hypophosphatemia  [ ] Hyperphosphatemia  ------------------------------------------------------------------------  [ ] Acute blood loss anemia  [ ] Post op blood loss anemia  [ ] Iron deficiency anemia  [ ] Anemia due to chronic disease  [ ] Hypercoagulable state  ----------------------------------------------------------------------  [ ] Cerebral infarction  [ ] Transient ischemia attack  [ ] Encephalopathy    Assessment and Plan:     76 F, PMH HTN, DM, PVD with non healing venous ulcer in LLE who presents to  ED on 8/18 due to infected venous ulcer of the LLE     Plan     #venous stasis ulcer of LLE  - PO bactrim and cipro  - wet to dry dressing changes w/ acetic acid bid     #DM  -ISS  -Hgb a1c 6.6% this admission. Takes metformin 500mg bid, Januvia 50mg qAM at home  -Consistant carb diet     #HTN  - continue home losartan 25mg once daily    #GERD   - continue home PPI    #DVT ppx: on Eliquis at home     f/u AM labs  Diet: regular  Dispo: [facility] per PT     O/N: IOANA, VSS    Subjective: Patient seen and examined at bedside. No acute events overnight.    Medications:  apixaban 5 milliGRAM(s) Oral two times a day  ciprofloxacin     Tablet 500 milliGRAM(s) Oral every 12 hours  losartan 25 milliGRAM(s) Oral daily  trimethoprim  160 mG/sulfamethoxazole 800 mG 2 Tablet(s) Oral two times a day      Vital Signs Last 24 Hrs  T(C): 36.8 (21 Aug 2021 13:56), Max: 36.9 (20 Aug 2021 18:20)  T(F): 98.2 (21 Aug 2021 13:56), Max: 98.4 (20 Aug 2021 18:20)  HR: 93 (21 Aug 2021 12:26) (71 - 111)  BP: 103/57 (21 Aug 2021 12:26) (103/57 - 141/68)  BP(mean): --  RR: 18 (21 Aug 2021 12:26) (18 - 18)  SpO2: 97% (21 Aug 2021 12:26) (95% - 98%)  I&O's Detail    20 Aug 2021 07:01  -  21 Aug 2021 07:00  --------------------------------------------------------  IN:    Oral Fluid: 780 mL  Total IN: 780 mL    OUT:    Voided (mL): 1950 mL  Total OUT: 1950 mL    Total NET: -1170 mL      21 Aug 2021 07:01  -  21 Aug 2021 14:47  --------------------------------------------------------  IN:    Oral Fluid: 360 mL  Total IN: 360 mL    OUT:    Voided (mL): 700 mL  Total OUT: 700 mL    Total NET: -340 mL          Physical Exam:  GENERAL: NAD, lying comfortably in bed   Pulmonary: no respiratory distress, non labored breathing   Cardiovascular: RRR  Abdomen: Soft, NT/ND  Extremities:  ulcer over the left medial maleous notable for mixed pink tissue and some fibrinous exudate; improving erythema on edges of ulcer with, edema of LLE    LABS:                        11.9   8.99  )-----------( 269      ( 21 Aug 2021 07:34 )             38.0     08-21    137  |  102  |  17  ----------------------------<  118<H>  5.0   |  24  |  0.72    Ca    9.8      21 Aug 2021 07:34  Phos  3.3     08-21  Mg     1.8     08-21            RADIOLOGY & ADDITIONAL STUDIES:        ---------------------------------------------------------------------------  PLEASE CHECK WHEN PRESENT:     [  ] Heart Failure     [  ] Acute     [  ] Acute on Chronic     [  ] Chronic  -------------------------------------------------------------------     [  ]Diastolic [HFpEF]     [  ]Systolic [HFrEF]     [  ]Combined [HFpEF & HFrEF]     [  ] afib     [  ] hypertensive heart disease     [  ]Other:  -------------------------------------------------------------------  [ ] Respiratory failure  [ ] Acute cor pulmonale  [ ] Asthma/COPD Exacerbation  [ ] Pleural effusion  [ ] Aspiration pneumonia  -------------------------------------------------------------------  [  ]JAYRO     [  ]ATN     [  ]Reneal Medullary Necrosis     [  ]Renal Cortical Necrosis     [  ]Other Pathological Lesions:    [  ]CKD 1  [  ]CKD 2  [  ]CKD 3  [  ]CKD 4  [  ]CKD 5  [  ]Other  -------------------------------------------------------------------  [ x ]Diabetes  [x  ] Diabetic PVD Ulcer  [  ] Neuropathic ulcer to DM  [  ] Diabetes with Nephropathy  [  ] Osteomyelitis due to diabetes  --------------------------------------------------------------------  [  ]Malnutrition: See Nutrition Note  [  ]Cachexia  [  ]Other:   [  ]Supplement Ordered:  [  ]Morbid Obesity (BMI >=40]  ---------------------------------------------------------------------  [ ] Sepsis/severe sepsis/septic shock  [ ] UTI  [ ] Pneumonia  -----------------------------------------------------------------------  [ ] Acidosis/alkalosis  [ ] Fluid overload  [ ] Hypokalemia  [ ] Hyperkalemia  [ ] Hypomagnesemia  [ ] Hypophosphatemia  [ ] Hyperphosphatemia  ------------------------------------------------------------------------  [ ] Acute blood loss anemia  [ ] Post op blood loss anemia  [ ] Iron deficiency anemia  [ ] Anemia due to chronic disease  [ ] Hypercoagulable state  ----------------------------------------------------------------------  [ ] Cerebral infarction  [ ] Transient ischemia attack  [ ] Encephalopathy    Assessment and Plan:     76 F, PMH HTN, DM, PVD with non healing venous ulcer in LLE who presents to  ED on 8/18 due to infected venous ulcer of the LLE     Plan     #venous stasis ulcer of LLE  - PO bactrim and cipro  - wet to dry dressing changes w/ acetic acid bid     #DM  -ISS  -Hgb a1c 6.6% this admission. Takes metformin 500mg bid, Januvia 50mg qAM at home  -Consistant carb diet     #HTN  - continue home losartan 25mg once daily    #GERD   - continue home PPI    #DVT ppx: on Eliquis at home     f/u AM labs  Diet: regular  Dispo: [facility] per PT

## 2021-08-22 LAB
GLUCOSE BLDC GLUCOMTR-MCNC: 103 MG/DL — HIGH (ref 70–99)
GLUCOSE BLDC GLUCOMTR-MCNC: 119 MG/DL — HIGH (ref 70–99)
GLUCOSE BLDC GLUCOMTR-MCNC: 131 MG/DL — HIGH (ref 70–99)
GLUCOSE BLDC GLUCOMTR-MCNC: 186 MG/DL — HIGH (ref 70–99)

## 2021-08-22 PROCEDURE — 99232 SBSQ HOSP IP/OBS MODERATE 35: CPT

## 2021-08-22 RX ADMIN — Medication 2: at 22:06

## 2021-08-22 RX ADMIN — MORPHINE SULFATE 1 MILLIGRAM(S): 50 CAPSULE, EXTENDED RELEASE ORAL at 02:06

## 2021-08-22 RX ADMIN — Medication 500 MILLIGRAM(S): at 18:47

## 2021-08-22 RX ADMIN — APIXABAN 5 MILLIGRAM(S): 2.5 TABLET, FILM COATED ORAL at 18:47

## 2021-08-22 RX ADMIN — Medication 2 TABLET(S): at 18:47

## 2021-08-22 RX ADMIN — GABAPENTIN 300 MILLIGRAM(S): 400 CAPSULE ORAL at 06:01

## 2021-08-22 RX ADMIN — GABAPENTIN 300 MILLIGRAM(S): 400 CAPSULE ORAL at 22:05

## 2021-08-22 RX ADMIN — MORPHINE SULFATE 1 MILLIGRAM(S): 50 CAPSULE, EXTENDED RELEASE ORAL at 01:51

## 2021-08-22 RX ADMIN — LOSARTAN POTASSIUM 25 MILLIGRAM(S): 100 TABLET, FILM COATED ORAL at 06:02

## 2021-08-22 RX ADMIN — Medication 1 APPLICATION(S): at 06:02

## 2021-08-22 RX ADMIN — PANTOPRAZOLE SODIUM 40 MILLIGRAM(S): 20 TABLET, DELAYED RELEASE ORAL at 06:02

## 2021-08-22 RX ADMIN — MORPHINE SULFATE 1 MILLIGRAM(S): 50 CAPSULE, EXTENDED RELEASE ORAL at 16:28

## 2021-08-22 RX ADMIN — Medication 500 MILLIGRAM(S): at 06:01

## 2021-08-22 RX ADMIN — ATORVASTATIN CALCIUM 20 MILLIGRAM(S): 80 TABLET, FILM COATED ORAL at 22:05

## 2021-08-22 RX ADMIN — MORPHINE SULFATE 1 MILLIGRAM(S): 50 CAPSULE, EXTENDED RELEASE ORAL at 21:02

## 2021-08-22 RX ADMIN — APIXABAN 5 MILLIGRAM(S): 2.5 TABLET, FILM COATED ORAL at 06:02

## 2021-08-22 RX ADMIN — Medication 2 TABLET(S): at 06:01

## 2021-08-22 RX ADMIN — GABAPENTIN 300 MILLIGRAM(S): 400 CAPSULE ORAL at 13:09

## 2021-08-22 RX ADMIN — MORPHINE SULFATE 1 MILLIGRAM(S): 50 CAPSULE, EXTENDED RELEASE ORAL at 20:32

## 2021-08-22 NOTE — PROGRESS NOTE ADULT - SUBJECTIVE AND OBJECTIVE BOX
O/N: IOANA, VSS    Subjective: Patient seen and examined at bedside. No acute events overnight    ciprofloxacin     Tablet 500  trimethoprim  160 mG/sulfamethoxazole 800 mG 2  apixaban 5  ciprofloxacin     Tablet 500  losartan 25  trimethoprim  160 mG/sulfamethoxazole 800 mG 2      Allergies    ampicillin (Hives)  Diovan (Anaphylaxis)  tetracycline (Anaphylaxis)    Intolerances        Vital Signs Last 24 Hrs  T(C): 36.8 (22 Aug 2021 14:09), Max: 36.8 (22 Aug 2021 14:09)  T(F): 98.2 (22 Aug 2021 14:09), Max: 98.2 (22 Aug 2021 14:09)  HR: 90 (22 Aug 2021 09:08) (84 - 104)  BP: 121/59 (22 Aug 2021 09:08) (110/58 - 125/58)  BP(mean): --  RR: 18 (22 Aug 2021 09:08) (18 - 18)  SpO2: 97% (22 Aug 2021 09:08) (94% - 97%)  I&O's Summary    21 Aug 2021 07:01  -  22 Aug 2021 07:00  --------------------------------------------------------  IN: 720 mL / OUT: 1300 mL / NET: -580 mL    22 Aug 2021 07:01  -  22 Aug 2021 14:19  --------------------------------------------------------  IN: 360 mL / OUT: 300 mL / NET: 60 mL        Physical Exam:  GENERAL: NAD, lying comfortably in bed   Pulmonary: no respiratory distress, non labored breathing   Cardiovascular: RRR  Abdomen: Soft, NT/ND  Extremities:  ulcer over the left medial maleous notable for mixed pink tissue and some fibrinous exudate; improving erythema on edges of ulcer with, edema of LLE    LABS:                        11.9   8.99  )-----------( 269      ( 21 Aug 2021 07:34 )             38.0     08-21    137  |  102  |  17  ----------------------------<  118<H>  5.0   |  24  |  0.72    Ca    9.8      21 Aug 2021 07:34  Phos  3.3     08-21  Mg     1.8     08-21          Radiology and Additional Studies:    ---------------------------------------------------------------------------  PLEASE CHECK WHEN PRESENT:     [  ] Heart Failure     [  ] Acute     [  ] Acute on Chronic     [  ] Chronic  -------------------------------------------------------------------     [  ]Diastolic [HFpEF]     [  ]Systolic [HFrEF]     [  ]Combined [HFpEF & HFrEF]     [  ] afib     [  ] hypertensive heart disease     [  ]Other:  -------------------------------------------------------------------  [ ] Respiratory failure  [ ] Acute cor pulmonale  [ ] Asthma/COPD Exacerbation  [ ] Pleural effusion  [ ] Aspiration pneumonia  -------------------------------------------------------------------  [  ]JAYRO     [  ]ATN     [  ]Reneal Medullary Necrosis     [  ]Renal Cortical Necrosis     [  ]Other Pathological Lesions:    [  ]CKD 1  [  ]CKD 2  [  ]CKD 3  [  ]CKD 4  [  ]CKD 5  [  ]Other  -------------------------------------------------------------------  [x  ]Diabetes  [x  ] Diabetic PVD Ulcer  [  ] Neuropathic ulcer to DM  [  ] Diabetes with Nephropathy  [  ] Osteomyelitis due to diabetes  --------------------------------------------------------------------  [  ]Malnutrition: See Nutrition Note  [  ]Cachexia  [  ]Other:   [  ]Supplement Ordered:  [  ]Morbid Obesity (BMI >=40]  ---------------------------------------------------------------------  [ ] Sepsis/severe sepsis/septic shock  [ ] UTI  [ ] Pneumonia  -----------------------------------------------------------------------  [ ] Acidosis/alkalosis  [ ] Fluid overload  [ ] Hypokalemia  [ ] Hyperkalemia  [ ] Hypomagnesemia  [ ] Hypophosphatemia  [ ] Hyperphosphatemia  ------------------------------------------------------------------------  [ ] Acute blood loss anemia  [ ] Post op blood loss anemia  [ ] Iron deficiency anemia  [ ] Anemia due to chronic disease  [ ] Hypercoagulable state  ----------------------------------------------------------------------  [ ] Cerebral infarction  [ ] Transient ischemia attack  [ ] Encephalopathy

## 2021-08-22 NOTE — PROGRESS NOTE ADULT - ASSESSMENT
76YOF with history of non-insulin dependent type 2 diabetes (a1c 6.6% this admission), essential hypertension, GERD, PAD, on Eliquis, admitted for infected diabetic foot ulcer of LLE.    Diabetic foot infection  LLE diabetic ulcer  PAD  Persistent ulcer of LLE, now infected. Currently being tx with systemic abx   -on bactrim/cipro for now  -wound care per vascular  -continue statin    Recent fall  Patient reports fall at home last week, no injuries, has been unsteady on feet.  - PT rec KAREN, pending KAREN     Non-insulin dependent type 2 diabetes  Hgb a1c 6.6% this admission. Takes metformin 500mg bid, Januvia 50mg qAM at home  -FSBG qac/qhs with SSI      On Eliquis  Unclear indication; unable to obtain from pharmacy and patient, primary team unable to reach outpatient provider. Continue for now    Essential hypertension - continue home losartan 25mg once daily  GERD - continue home PPI    Dispo: pending KAREN

## 2021-08-22 NOTE — PROGRESS NOTE ADULT - SUBJECTIVE AND OBJECTIVE BOX
INTERVAL HPI/OVERNIGHT EVENTS: IOANA. No new complaints, ROS unchanged. Pending KAREN.     VITAL SIGNS:  T(F): 98.2 (08-22-21 @ 14:09)  HR: 90 (08-22-21 @ 09:08)  BP: 121/59 (08-22-21 @ 09:08)  RR: 18 (08-22-21 @ 09:08)  SpO2: 97% (08-22-21 @ 09:08)  Wt(kg): --    PHYSICAL EXAM:      Constitutional: NAD, well-groomed, well-developed  HEENT: PERRLA, EOMI, Normal Hearing, MMM  Neck: No LAD, No JVD  Back: Normal spine flexure, No CVA tenderness  Respiratory: CTABCardiovascular: S1 and S2, RRR, no M/G/R  Gastrointestinal: BS+, soft, NT/ND  Extremities: LLE w/ ace wrap c/d/i  Vascular: 2+ peripheral pulses  Neurological: A/O x 3, no focal deficits  Skin: No rashes        MEDICATIONS  (STANDING):  acetic acid 0.25% Topical Irrigation 1 Application(s) Topical two times a day  apixaban 5 milliGRAM(s) Oral two times a day  atorvastatin 20 milliGRAM(s) Oral at bedtime  ciprofloxacin     Tablet 500 milliGRAM(s) Oral every 12 hours  dextrose 40% Gel 15 Gram(s) Oral once  dextrose 5%. 1000 milliLiter(s) (50 mL/Hr) IV Continuous <Continuous>  dextrose 50% Injectable 25 Gram(s) IV Push once  gabapentin 300 milliGRAM(s) Oral three times a day  glucagon  Injectable 1 milliGRAM(s) IntraMuscular once  insulin lispro (ADMELOG) corrective regimen sliding scale   SubCutaneous Before meals and at bedtime  losartan 25 milliGRAM(s) Oral daily  pantoprazole    Tablet 40 milliGRAM(s) Oral before breakfast  trimethoprim  160 mG/sulfamethoxazole 800 mG 2 Tablet(s) Oral two times a day    MEDICATIONS  (PRN):  acetaminophen   Tablet .. 650 milliGRAM(s) Oral every 6 hours PRN Mild Pain (1 - 3)  morphine  - Injectable 1 milliGRAM(s) IV Push every 4 hours PRN Severe Pain (7 - 10)  oxyCODONE    IR 5 milliGRAM(s) Oral every 4 hours PRN Moderate Pain (4 - 6)      Allergies    ampicillin (Hives)  Diovan (Anaphylaxis)  tetracycline (Anaphylaxis)    Intolerances        LABS:                        11.9   8.99  )-----------( 269      ( 21 Aug 2021 07:34 )             38.0     08-21    137  |  102  |  17  ----------------------------<  118<H>  5.0   |  24  |  0.72    Ca    9.8      21 Aug 2021 07:34  Phos  3.3     08-21  Mg     1.8     08-21            RADIOLOGY & ADDITIONAL TESTS:

## 2021-08-22 NOTE — PROGRESS NOTE ADULT - ASSESSMENT
76 F, PMH HTN, DM, PVD with non healing venous ulcer in LLE who presents to  ED on 8/18 due to infected venous ulcer of the LLE     Plan     #venous stasis ulcer of LLE  - PO bactrim and cipro  - wet to dry dressing changes w/ acetic acid bid     #DM  -ISS  -Hgb a1c 6.6% this admission. Takes metformin 500mg bid, Januvia 50mg qAM at home  -Consistant carb diet     #HTN  - continue home losartan 25mg once daily    #GERD   - continue home PPI    #DVT ppx: on Eliquis at home     f/u AM labs  Diet: regular  Dispo: [facility] per PT

## 2021-08-23 LAB
CULTURE RESULTS: SIGNIFICANT CHANGE UP
CULTURE RESULTS: SIGNIFICANT CHANGE UP
GLUCOSE BLDC GLUCOMTR-MCNC: 119 MG/DL — HIGH (ref 70–99)
GLUCOSE BLDC GLUCOMTR-MCNC: 127 MG/DL — HIGH (ref 70–99)
GLUCOSE BLDC GLUCOMTR-MCNC: 141 MG/DL — HIGH (ref 70–99)
GLUCOSE BLDC GLUCOMTR-MCNC: 194 MG/DL — HIGH (ref 70–99)
SPECIMEN SOURCE: SIGNIFICANT CHANGE UP
SPECIMEN SOURCE: SIGNIFICANT CHANGE UP

## 2021-08-23 PROCEDURE — 73502 X-RAY EXAM HIP UNI 2-3 VIEWS: CPT | Mod: 26,LT

## 2021-08-23 PROCEDURE — 99232 SBSQ HOSP IP/OBS MODERATE 35: CPT

## 2021-08-23 RX ORDER — POLYETHYLENE GLYCOL 3350 17 G/17G
17 POWDER, FOR SOLUTION ORAL DAILY
Refills: 0 | Status: DISCONTINUED | OUTPATIENT
Start: 2021-08-23 | End: 2021-08-24

## 2021-08-23 RX ORDER — SENNA PLUS 8.6 MG/1
2 TABLET ORAL AT BEDTIME
Refills: 0 | Status: DISCONTINUED | OUTPATIENT
Start: 2021-08-23 | End: 2021-08-24

## 2021-08-23 RX ORDER — ONDANSETRON 8 MG/1
4 TABLET, FILM COATED ORAL ONCE
Refills: 0 | Status: COMPLETED | OUTPATIENT
Start: 2021-08-23 | End: 2021-08-23

## 2021-08-23 RX ORDER — SIMETHICONE 80 MG/1
80 TABLET, CHEWABLE ORAL ONCE
Refills: 0 | Status: COMPLETED | OUTPATIENT
Start: 2021-08-23 | End: 2021-08-23

## 2021-08-23 RX ADMIN — Medication 500 MILLIGRAM(S): at 17:31

## 2021-08-23 RX ADMIN — GABAPENTIN 300 MILLIGRAM(S): 400 CAPSULE ORAL at 21:18

## 2021-08-23 RX ADMIN — Medication 5 MILLIGRAM(S): at 14:45

## 2021-08-23 RX ADMIN — GABAPENTIN 300 MILLIGRAM(S): 400 CAPSULE ORAL at 05:31

## 2021-08-23 RX ADMIN — Medication 2: at 21:18

## 2021-08-23 RX ADMIN — Medication 2 TABLET(S): at 05:32

## 2021-08-23 RX ADMIN — APIXABAN 5 MILLIGRAM(S): 2.5 TABLET, FILM COATED ORAL at 05:32

## 2021-08-23 RX ADMIN — POLYETHYLENE GLYCOL 3350 17 GRAM(S): 17 POWDER, FOR SOLUTION ORAL at 14:45

## 2021-08-23 RX ADMIN — Medication 500 MILLIGRAM(S): at 05:32

## 2021-08-23 RX ADMIN — MORPHINE SULFATE 1 MILLIGRAM(S): 50 CAPSULE, EXTENDED RELEASE ORAL at 06:19

## 2021-08-23 RX ADMIN — GABAPENTIN 300 MILLIGRAM(S): 400 CAPSULE ORAL at 14:45

## 2021-08-23 RX ADMIN — Medication 2 TABLET(S): at 17:31

## 2021-08-23 RX ADMIN — ONDANSETRON 4 MILLIGRAM(S): 8 TABLET, FILM COATED ORAL at 09:02

## 2021-08-23 RX ADMIN — MORPHINE SULFATE 1 MILLIGRAM(S): 50 CAPSULE, EXTENDED RELEASE ORAL at 06:49

## 2021-08-23 RX ADMIN — PANTOPRAZOLE SODIUM 40 MILLIGRAM(S): 20 TABLET, DELAYED RELEASE ORAL at 05:32

## 2021-08-23 RX ADMIN — SENNA PLUS 2 TABLET(S): 8.6 TABLET ORAL at 17:31

## 2021-08-23 RX ADMIN — SIMETHICONE 80 MILLIGRAM(S): 80 TABLET, CHEWABLE ORAL at 16:43

## 2021-08-23 RX ADMIN — ATORVASTATIN CALCIUM 20 MILLIGRAM(S): 80 TABLET, FILM COATED ORAL at 21:19

## 2021-08-23 RX ADMIN — APIXABAN 5 MILLIGRAM(S): 2.5 TABLET, FILM COATED ORAL at 17:31

## 2021-08-23 RX ADMIN — LOSARTAN POTASSIUM 25 MILLIGRAM(S): 100 TABLET, FILM COATED ORAL at 05:32

## 2021-08-23 NOTE — PROGRESS NOTE ADULT - SUBJECTIVE AND OBJECTIVE BOX
24hr Events:  O/N; Dressing changed, VSS  8/22: acetic acid dressing change        --------------------------------------------------------------------------  PLEASE CHECK WHEN PRESENT:     [  ] Heart Failure     [  ] Acute     [  ] Acute on Chronic     [  ] Chronic  -------------------------------------------------------------------     [  ]Diastolic [HFpEF]     [  ]Systolic [HFrEF]     [  ]Combined [HFpEF & HFrEF]     [  ] afib     [  ] hypertensive heart disease     [  ]Other:  -------------------------------------------------------------------  [ ] Respiratory failure  [ ] Acute cor pulmonale  [ ] Asthma/COPD Exacerbation  [ ] Pleural effusion  [ ] Aspiration pneumonia  -------------------------------------------------------------------  [  ]JAYRO     [  ]ATN     [  ]Reneal Medullary Necrosis     [  ]Renal Cortical Necrosis     [  ]Other Pathological Lesions:    [  ]CKD 1  [  ]CKD 2  [  ]CKD 3  [  ]CKD 4  [  ]CKD 5  [  ]Other  -------------------------------------------------------------------  [x  ]Diabetes  [x  ] Diabetic PVD Ulcer  [  ] Neuropathic ulcer to DM  [  ] Diabetes with Nephropathy  [  ] Osteomyelitis due to diabetes  --------------------------------------------------------------------  [  ]Malnutrition: See Nutrition Note  [  ]Cachexia  [  ]Other:   [  ]Supplement Ordered:  [  ]Morbid Obesity (BMI >=40]  ---------------------------------------------------------------------  [ ] Sepsis/severe sepsis/septic shock  [ ] UTI  [ ] Pneumonia  -----------------------------------------------------------------------  [ ] Acidosis/alkalosis  [ ] Fluid overload  [ ] Hypokalemia  [ ] Hyperkalemia  [ ] Hypomagnesemia  [ ] Hypophosphatemia  [ ] Hyperphosphatemia  ------------------------------------------------------------------------  [ ] Acute blood loss anemia  [ ] Post op blood loss anemia  [ ] Iron deficiency anemia  [ ] Anemia due to chronic disease  [ ] Hypercoagulable state  ----------------------------------------------------------------------  [ ] Cerebral infarction  [ ] Transient ischemia attack  [ ] Encephalopathy    Assessment and Plan:   · Assessment	    76 F, PMH HTN, DM, PVD with non healing venous ulcer in LLE who presents to  ED on 8/18 due to infected venous ulcer of the LLE     Plan     #venous stasis ulcer of LLE  - PO bactrim and cipro  - wet to dry dressing changes w/ acetic acid bid     #DM  -ISS  -Hgb a1c 6.6% this admission. Takes metformin 500mg bid, Januvia 50mg qAM at home  -Consistant carb diet     #HTN  - continue home losartan 25mg once daily    #GERD   - continue home PPI    #DVT ppx: on Eliquis at home     f/u AM labs  Diet: regular  Dispo: KAREN per PT     24hr Events:  O/N; Dressing changed, VSS  8/22: acetic acid dressing change    subjective: dressing changed this AM, pt complain of left leg pain during dressing change but otherwise pain well controlled, no other acute complaints at this time     ciprofloxacin     Tablet 500  trimethoprim  160 mG/sulfamethoxazole 800 mG 2  apixaban 5  ciprofloxacin     Tablet 500  losartan 25  trimethoprim  160 mG/sulfamethoxazole 800 mG 2      Allergies    ampicillin (Hives)  Diovan (Anaphylaxis)  tetracycline (Anaphylaxis)    Intolerances        Vital Signs Last 24 Hrs  T(C): 36.4 (23 Aug 2021 09:42), Max: 37 (22 Aug 2021 18:02)  T(F): 97.5 (23 Aug 2021 09:42), Max: 98.6 (22 Aug 2021 18:02)  HR: 95 (23 Aug 2021 08:59) (93 - 105)  BP: 133/68 (23 Aug 2021 08:59) (117/64 - 161/70)  BP(mean): 100 (23 Aug 2021 05:28) (92 - 100)  RR: 16 (23 Aug 2021 08:59) (16 - 18)  SpO2: 100% (23 Aug 2021 08:59) (93% - 100%)  I&O's Summary    22 Aug 2021 07:01  -  23 Aug 2021 07:00  --------------------------------------------------------  IN: 720 mL / OUT: 1300 mL / NET: -580 mL    23 Aug 2021 07:01  -  23 Aug 2021 10:34  --------------------------------------------------------  IN: 180 mL / OUT: 0 mL / NET: 180 mL        Physical Exam:  GENERAL: NAD, lying comfortably in bed   Pulmonary: no respiratory distress, non labored breathing   Cardiovascular: RRR  Abdomen: Soft, NT/ND  Extremities:  well healing ulcer over the left medial maleous notable for mixed pink tissue and some fibrinous exudate; improving erythema on edges of ulcer with, edema of LLE    LABS:              Radiology and Additional Studies:    --------------------------------------------------------------------------  PLEASE CHECK WHEN PRESENT:     [  ] Heart Failure     [  ] Acute     [  ] Acute on Chronic     [  ] Chronic  -------------------------------------------------------------------     [  ]Diastolic [HFpEF]     [  ]Systolic [HFrEF]     [  ]Combined [HFpEF & HFrEF]     [  ] afib     [  ] hypertensive heart disease     [  ]Other:  -------------------------------------------------------------------  [ ] Respiratory failure  [ ] Acute cor pulmonale  [ ] Asthma/COPD Exacerbation  [ ] Pleural effusion  [ ] Aspiration pneumonia  -------------------------------------------------------------------  [  ]JAYRO     [  ]ATN     [  ]Reneal Medullary Necrosis     [  ]Renal Cortical Necrosis     [  ]Other Pathological Lesions:    [  ]CKD 1  [  ]CKD 2  [  ]CKD 3  [  ]CKD 4  [  ]CKD 5  [  ]Other  -------------------------------------------------------------------  [x  ]Diabetes  [x  ] Diabetic PVD Ulcer  [  ] Neuropathic ulcer to DM  [  ] Diabetes with Nephropathy  [  ] Osteomyelitis due to diabetes  --------------------------------------------------------------------  [  ]Malnutrition: See Nutrition Note  [  ]Cachexia  [  ]Other:   [  ]Supplement Ordered:  [  ]Morbid Obesity (BMI >=40]  ---------------------------------------------------------------------  [ ] Sepsis/severe sepsis/septic shock  [ ] UTI  [ ] Pneumonia  -----------------------------------------------------------------------  [ ] Acidosis/alkalosis  [ ] Fluid overload  [ ] Hypokalemia  [ ] Hyperkalemia  [ ] Hypomagnesemia  [ ] Hypophosphatemia  [ ] Hyperphosphatemia  ------------------------------------------------------------------------  [ ] Acute blood loss anemia  [ ] Post op blood loss anemia  [ ] Iron deficiency anemia  [ ] Anemia due to chronic disease  [ ] Hypercoagulable state  ----------------------------------------------------------------------  [ ] Cerebral infarction  [ ] Transient ischemia attack  [ ] Encephalopathy    Assessment and Plan:   · Assessment	    76 F, PMH HTN, DM, PVD with non healing venous ulcer in LLE who presents to  ED on 8/18 due to infected venous ulcer of the LLE     Plan     #venous stasis ulcer of LLE  - PO bactrim and cipro  - wet to dry dressing changes w/ acetic acid bid     #DM  -ISS  -Hgb a1c 6.6% this admission. Takes metformin 500mg bid, Januvia 50mg qAM at home  -Consistant carb diet     #HTN  - continue home losartan 25mg once daily    #GERD   - continue home PPI    #DVT ppx: on Eliquis at home     no more labs for admission   Diet: regular  Dispo: KAREN per PT

## 2021-08-23 NOTE — DIETITIAN INITIAL EVALUATION ADULT. - OTHER INFO
75 yo female pmh of htn, dm, pvd who presented to the ED for infected venous ulcer of the LLE. Admitted for Persistent ulcer of LLE, now infected. Currently being tx with systemic abx. PT rec KAREN, pending KAREN.     Pt seen alert in room on 5UR. Reports only taking tea at breakfast this AM. Reports decreased PO intake during admission/PTA x6 months 2/2 Pain and lack of appetite (+L foot Pain). Also reports decreased PO during admission 2/2 GI distress, reports +Constipation with last BM p9firce ago. Per flowsheets +BM8/17. Takes metamucil PTA. Does report wt loss PTA;  pounds x3-4months ago, down to 140 pounds now (15pound/9.6% Body wt loss). 1+BL Leg edema likely masking further wt loss. Not taking ONS PTA and does not want to take at this time as she does not like flavors offered. NKFA. Mayur 17.   Please see below for nutritions recommendations. Recs made with team.

## 2021-08-23 NOTE — DIETITIAN INITIAL EVALUATION ADULT. - OTHER CALCULATIONS
%CXO=920; IBW used to calculate energy needs due to pt's current body weight exceeding 120% of IBW; adjusted for age malnutrition and Skin

## 2021-08-23 NOTE — PROGRESS NOTE ADULT - SUBJECTIVE AND OBJECTIVE BOX
Subjective/Interval events:  No acute overnight events. Says pain in foot is getting better. No fever/chills, tolerating diet.    MEDICATIONS  (STANDING):  acetic acid 0.25% Topical Irrigation 1 Application(s) Topical two times a day  apixaban 5 milliGRAM(s) Oral two times a day  atorvastatin 20 milliGRAM(s) Oral at bedtime  ciprofloxacin     Tablet 500 milliGRAM(s) Oral every 12 hours  dextrose 40% Gel 15 Gram(s) Oral once  dextrose 5%. 1000 milliLiter(s) (50 mL/Hr) IV Continuous <Continuous>  dextrose 50% Injectable 25 Gram(s) IV Push once  gabapentin 300 milliGRAM(s) Oral three times a day  glucagon  Injectable 1 milliGRAM(s) IntraMuscular once  insulin lispro (ADMELOG) corrective regimen sliding scale   SubCutaneous Before meals and at bedtime  losartan 25 milliGRAM(s) Oral daily  pantoprazole    Tablet 40 milliGRAM(s) Oral before breakfast  trimethoprim  160 mG/sulfamethoxazole 800 mG 2 Tablet(s) Oral two times a day    MEDICATIONS  (PRN):  acetaminophen   Tablet .. 650 milliGRAM(s) Oral every 6 hours PRN Mild Pain (1 - 3)  morphine  - Injectable 1 milliGRAM(s) IV Push every 4 hours PRN Severe Pain (7 - 10)  oxyCODONE    IR 5 milliGRAM(s) Oral every 4 hours PRN Moderate Pain (4 - 6)      Vital Signs Last 24 Hrs  T(C): 36.4 (23 Aug 2021 09:42), Max: 37 (22 Aug 2021 18:02)  T(F): 97.5 (23 Aug 2021 09:42), Max: 98.6 (22 Aug 2021 18:02)  HR: 95 (23 Aug 2021 08:59) (93 - 105)  BP: 133/68 (23 Aug 2021 08:59) (117/64 - 161/70)  BP(mean): 100 (23 Aug 2021 05:28) (92 - 100)  RR: 16 (23 Aug 2021 08:59) (16 - 18)  SpO2: 100% (23 Aug 2021 08:59) (93% - 100%)    PHYSICAL EXAM:  GENERAL: pleasant, appropriate, no acute distress. Participating appropriately in interview  HEENT - NC/AT, EOMI, PERLLA, oropharynx clear without exudate or erythema, moist mucus membranes  NECK: Soft, supple, No JVD, no lymphadenopathy  CHEST/LUNG: Clear to auscultation bilaterally; No rales, rhonchi, wheezing. Normal work of breathing, not tachypneic  HEART: Regular rate and rhythm; No murmurs, rubs, or gallops, normal s1/s2. Warm and well-perfused  ABDOMEN: Soft, Nontender, Nondistended. Normoactive bowel sounds.  EXTREMITIES:  2+ Peripheral Pulses, No clubbing, cyanosis, or edema  NEURO:  awake, alert, oriented to person, place, time, and situation. Cranial nerves intact, no motor or sensory deficits. Moves all extremities.  SKIN: LLE wrapped in ACE, c/d/i    LABS:  reviewed, none new    RADIOLOGY & ADDITIONAL TESTS:  reviewed, none new

## 2021-08-23 NOTE — DIETITIAN INITIAL EVALUATION ADULT. - ADD RECOMMEND
1. Monitor PO intake/appetite, diet tolerance, labs, weights.  2. Honor pt food preferences as able.  3. Monitor GI/Pain - optimize Reg. 4. MVI. 5. RD to remain available for additional nutrition interventions as needed.

## 2021-08-23 NOTE — PROGRESS NOTE ADULT - ASSESSMENT
76YOF with history of non-insulin dependent type 2 diabetes (a1c 6.6% this admission), essential hypertension, GERD, PAD, on Eliquis, admitted for infected diabetic foot ulcer of LLE.    Diabetic foot infection  LLE diabetic ulcer  PAD  Persistent ulcer of LLE, now infected. Currently being tx with systemic abx   -on bactrim/cipro for now  -wound care per vascular  -continue statin    Recent fall  Patient reports fall at home last week, no injuries, has been unsteady on feet.  - PT rec KAREN, pending KAREN     Non-insulin dependent type 2 diabetes  Hgb a1c 6.6% this admission. Takes metformin 500mg bid, Januvia 50mg qAM at home  -FSBG qac/qhs with SSI      On Eliquis  Unclear indication; unable to obtain from pharmacy and patient, primary team unable to reach outpatient provider. Continue for now    Essential hypertension - continue home losartan 25mg once daily  GERD - continue home PPI    Dispo: pending KAREN, medically ready

## 2021-08-23 NOTE — DIETITIAN INITIAL EVALUATION ADULT. - PERSON TAUGHT/METHOD
Encourage PO intake during meals & reviewed importance - reviewed proteins, oral nutrition supplements. Tips for GI provided./verbal instruction/patient instructed

## 2021-08-24 ENCOUNTER — TRANSCRIPTION ENCOUNTER (OUTPATIENT)
Age: 76
End: 2021-08-24

## 2021-08-24 VITALS — TEMPERATURE: 97 F

## 2021-08-24 LAB
GLUCOSE BLDC GLUCOMTR-MCNC: 125 MG/DL — HIGH (ref 70–99)
SARS-COV-2 RNA SPEC QL NAA+PROBE: SIGNIFICANT CHANGE UP

## 2021-08-24 PROCEDURE — 97162 PT EVAL MOD COMPLEX 30 MIN: CPT

## 2021-08-24 PROCEDURE — 82962 GLUCOSE BLOOD TEST: CPT

## 2021-08-24 PROCEDURE — 73502 X-RAY EXAM HIP UNI 2-3 VIEWS: CPT

## 2021-08-24 PROCEDURE — 86769 SARS-COV-2 COVID-19 ANTIBODY: CPT

## 2021-08-24 PROCEDURE — 99285 EMERGENCY DEPT VISIT HI MDM: CPT

## 2021-08-24 PROCEDURE — 87086 URINE CULTURE/COLONY COUNT: CPT

## 2021-08-24 PROCEDURE — 80048 BASIC METABOLIC PNL TOTAL CA: CPT

## 2021-08-24 PROCEDURE — U0005: CPT

## 2021-08-24 PROCEDURE — 81003 URINALYSIS AUTO W/O SCOPE: CPT

## 2021-08-24 PROCEDURE — 97530 THERAPEUTIC ACTIVITIES: CPT

## 2021-08-24 PROCEDURE — U0003: CPT

## 2021-08-24 PROCEDURE — 85652 RBC SED RATE AUTOMATED: CPT

## 2021-08-24 PROCEDURE — 84100 ASSAY OF PHOSPHORUS: CPT

## 2021-08-24 PROCEDURE — 96374 THER/PROPH/DIAG INJ IV PUSH: CPT

## 2021-08-24 PROCEDURE — 73630 X-RAY EXAM OF FOOT: CPT

## 2021-08-24 PROCEDURE — 85025 COMPLETE CBC W/AUTO DIFF WBC: CPT

## 2021-08-24 PROCEDURE — 85027 COMPLETE CBC AUTOMATED: CPT

## 2021-08-24 PROCEDURE — 36415 COLL VENOUS BLD VENIPUNCTURE: CPT

## 2021-08-24 PROCEDURE — 87040 BLOOD CULTURE FOR BACTERIA: CPT

## 2021-08-24 PROCEDURE — 80053 COMPREHEN METABOLIC PANEL: CPT

## 2021-08-24 PROCEDURE — 83605 ASSAY OF LACTIC ACID: CPT

## 2021-08-24 PROCEDURE — 73610 X-RAY EXAM OF ANKLE: CPT

## 2021-08-24 PROCEDURE — 86140 C-REACTIVE PROTEIN: CPT

## 2021-08-24 PROCEDURE — 83036 HEMOGLOBIN GLYCOSYLATED A1C: CPT

## 2021-08-24 PROCEDURE — 99232 SBSQ HOSP IP/OBS MODERATE 35: CPT

## 2021-08-24 PROCEDURE — 83735 ASSAY OF MAGNESIUM: CPT

## 2021-08-24 RX ORDER — ACETAMINOPHEN 500 MG
2 TABLET ORAL
Qty: 0 | Refills: 0 | DISCHARGE
Start: 2021-08-24

## 2021-08-24 RX ORDER — OXYCODONE HYDROCHLORIDE 5 MG/1
1 TABLET ORAL
Qty: 0 | Refills: 0 | DISCHARGE
Start: 2021-08-24

## 2021-08-24 RX ORDER — OXYCODONE HYDROCHLORIDE 5 MG/1
1 TABLET ORAL
Qty: 20 | Refills: 0
Start: 2021-08-24

## 2021-08-24 RX ADMIN — APIXABAN 5 MILLIGRAM(S): 2.5 TABLET, FILM COATED ORAL at 06:14

## 2021-08-24 RX ADMIN — Medication 500 MILLIGRAM(S): at 06:13

## 2021-08-24 RX ADMIN — GABAPENTIN 300 MILLIGRAM(S): 400 CAPSULE ORAL at 06:14

## 2021-08-24 RX ADMIN — LOSARTAN POTASSIUM 25 MILLIGRAM(S): 100 TABLET, FILM COATED ORAL at 06:13

## 2021-08-24 RX ADMIN — GABAPENTIN 300 MILLIGRAM(S): 400 CAPSULE ORAL at 15:02

## 2021-08-24 RX ADMIN — PANTOPRAZOLE SODIUM 40 MILLIGRAM(S): 20 TABLET, DELAYED RELEASE ORAL at 06:14

## 2021-08-24 RX ADMIN — Medication 2 TABLET(S): at 06:14

## 2021-08-24 NOTE — PROGRESS NOTE ADULT - SUBJECTIVE AND OBJECTIVE BOX
24hr Events:  O/N: KIKA TRIPLETT  8/23: refusing KAREN, started on bowel regimen for constipation, amenable to KAREN again          --------------------------------------------------------------------------  PLEASE CHECK WHEN PRESENT:     [  ] Heart Failure     [  ] Acute     [  ] Acute on Chronic     [  ] Chronic  -------------------------------------------------------------------     [  ]Diastolic [HFpEF]     [  ]Systolic [HFrEF]     [  ]Combined [HFpEF & HFrEF]     [  ] afib     [  ] hypertensive heart disease     [  ]Other:  -------------------------------------------------------------------  [ ] Respiratory failure  [ ] Acute cor pulmonale  [ ] Asthma/COPD Exacerbation  [ ] Pleural effusion  [ ] Aspiration pneumonia  -------------------------------------------------------------------  [  ]JAYRO     [  ]ATN     [  ]Reneal Medullary Necrosis     [  ]Renal Cortical Necrosis     [  ]Other Pathological Lesions:    [  ]CKD 1  [  ]CKD 2  [  ]CKD 3  [  ]CKD 4  [  ]CKD 5  [  ]Other  -------------------------------------------------------------------  [x  ]Diabetes  [x  ] Diabetic PVD Ulcer  [  ] Neuropathic ulcer to DM  [  ] Diabetes with Nephropathy  [  ] Osteomyelitis due to diabetes  --------------------------------------------------------------------  [  ]Malnutrition: See Nutrition Note  [  ]Cachexia  [  ]Other:   [  ]Supplement Ordered:  [  ]Morbid Obesity (BMI >=40]  ---------------------------------------------------------------------  [ ] Sepsis/severe sepsis/septic shock  [ ] UTI  [ ] Pneumonia  -----------------------------------------------------------------------  [ ] Acidosis/alkalosis  [ ] Fluid overload  [ ] Hypokalemia  [ ] Hyperkalemia  [ ] Hypomagnesemia  [ ] Hypophosphatemia  [ ] Hyperphosphatemia  ------------------------------------------------------------------------  [ ] Acute blood loss anemia  [ ] Post op blood loss anemia  [ ] Iron deficiency anemia  [ ] Anemia due to chronic disease  [ ] Hypercoagulable state  ----------------------------------------------------------------------  [ ] Cerebral infarction  [ ] Transient ischemia attack  [ ] Encephalopathy    Assessment and Plan:   · Assessment	    76 F, PMH HTN, DM, PVD with non healing venous ulcer in LLE who presents to  ED on 8/18 due to infected venous ulcer of the LLE     Plan     #venous stasis ulcer of LLE  - PO bactrim and cipro  - wet to dry dressing changes w/ acetic acid bid     #DM  -ISS  -Hgb a1c 6.6% this admission. Takes metformin 500mg bid, Januvia 50mg qAM at home  -Consistant carb diet     #HTN  - continue home losartan 25mg once daily    #GERD   - continue home PPI    #DVT ppx: on Eliquis at home     no more labs for admission   Diet: regular  Dispo: KAREN per PT     O/N: IOANA, VSS  : refusing KAREN, started on bowel regimen for constipation, amenable to KAREN again    Subjective: pt seen and examined at bedside, dressing changed with wtd dressing, no acute complaints at this time   ciprofloxacin     Tablet 500  trimethoprim  160 mG/sulfamethoxazole 800 mG 2  apixaban 5  ciprofloxacin     Tablet 500  losartan 25  trimethoprim  160 mG/sulfamethoxazole 800 mG 2      Allergies    ampicillin (Hives)  Diovan (Anaphylaxis)  tetracycline (Anaphylaxis)    Intolerances        Vital Signs Last 24 Hrs  T(C): 36.5 (24 Aug 2021 06:13), Max: 36.5 (24 Aug 2021 06:13)  T(F): 97.7 (24 Aug 2021 06:13), Max: 97.7 (24 Aug 2021 06:13)  HR: 91 (24 Aug 2021 05:30) (81 - 107)  BP: 149/60 (24 Aug 2021 05:30) (117/76 - 156/67)  BP(mean): --  RR: 17 (24 Aug 2021 05:30) (16 - 18)  SpO2: 97% (24 Aug 2021 05:30) (95% - 100%)  I&O's Summary    23 Aug 2021 07:01  -  24 Aug 2021 07:00  --------------------------------------------------------  IN: 360 mL / OUT: 1150 mL / NET: -790 mL        Physical Exam:  GENERAL: NAD, lying comfortably in bed   Pulmonary: no respiratory distress, non labored breathing   Cardiovascular: RRR  Abdomen: Soft, NT/ND  Extremities:  ulcer over the left medial maleous, and over anterior tib notable for mixed pink tissue and some fibrinous exudate; minimal erythema on edges of ulcer wounds, minimal edema of LLE       LABS:  LABS/RADIOLOGY RESULTS:        Blood Cultures    Urinalysis Basic - ( 18 Aug 2021 17:02 )    Color: Yellow / Appearance: Clear / S.010 / pH:   Gluc:  / Ketone: NEGATIVE  / Bili: Negative / Urobili: 0.2 E.U./dL   Blood:  / Protein: NEGATIVE mg/dL / Nitrite: NEGATIVE   Leuk Esterase: NEGATIVE / RBC:  / WBC    Sq Epi:  / Non Sq Epi:  / Bacteria:         --------------------------------------------------------------------------  PLEASE CHECK WHEN PRESENT:     [  ] Heart Failure     [  ] Acute     [  ] Acute on Chronic     [  ] Chronic  -------------------------------------------------------------------     [  ]Diastolic [HFpEF]     [  ]Systolic [HFrEF]     [  ]Combined [HFpEF & HFrEF]     [  ] afib     [  ] hypertensive heart disease     [  ]Other:  -------------------------------------------------------------------  [ ] Respiratory failure  [ ] Acute cor pulmonale  [ ] Asthma/COPD Exacerbation  [ ] Pleural effusion  [ ] Aspiration pneumonia  -------------------------------------------------------------------  [  ]JAYRO     [  ]ATN     [  ]Reneal Medullary Necrosis     [  ]Renal Cortical Necrosis     [  ]Other Pathological Lesions:    [  ]CKD 1  [  ]CKD 2  [  ]CKD 3  [  ]CKD 4  [  ]CKD 5  [  ]Other  -------------------------------------------------------------------  [x  ]Diabetes  [x  ] Diabetic PVD Ulcer  [  ] Neuropathic ulcer to DM  [  ] Diabetes with Nephropathy  [  ] Osteomyelitis due to diabetes  --------------------------------------------------------------------  [  ]Malnutrition: See Nutrition Note  [  ]Cachexia  [  ]Other:   [  ]Supplement Ordered:  [  ]Morbid Obesity (BMI >=40]  ---------------------------------------------------------------------  [ ] Sepsis/severe sepsis/septic shock  [ ] UTI  [ ] Pneumonia  -----------------------------------------------------------------------  [ ] Acidosis/alkalosis  [ ] Fluid overload  [ ] Hypokalemia  [ ] Hyperkalemia  [ ] Hypomagnesemia  [ ] Hypophosphatemia  [ ] Hyperphosphatemia  ------------------------------------------------------------------------  [ ] Acute blood loss anemia  [ ] Post op blood loss anemia  [ ] Iron deficiency anemia  [ ] Anemia due to chronic disease  [ ] Hypercoagulable state  ----------------------------------------------------------------------  [ ] Cerebral infarction  [ ] Transient ischemia attack  [ ] Encephalopathy    Assessment and Plan:   · Assessment	    76 F, PMH HTN, DM, PVD with non healing venous ulcer in LLE who presents to  ED on  due to infected venous ulcer of the LLE     Plan     #venous stasis ulcer of LLE  - PO bactrim and cipro (2 week course, started on PO on  at 1700)  - wet to dry dressing changes w/ NS qd    #DM  -ISS  -Hgb a1c 6.6% this admission. Takes metformin 500mg bid, Januvia 50mg qAM at home  -Consistant carb diet     #HTN  - continue home losartan 25mg once daily    #GERD   - continue home PPI    #DVT ppx: restarted on home eliquis      no more labs for admission   Diet: regular  Dispo: KAREN per PT

## 2021-08-24 NOTE — DISCHARGE NOTE NURSING/CASE MANAGEMENT/SOCIAL WORK - NSDCPEFALRISK_GEN_ALL_CORE
For information on Fall & injury Prevention, visit [email or web address]
For information on Fall & injury Prevention, visit [email or web address]

## 2021-08-24 NOTE — DISCHARGE NOTE NURSING/CASE MANAGEMENT/SOCIAL WORK - PATIENT PORTAL LINK FT
You can access the FollowMyHealth Patient Portal offered by [facility] by registering at the following website: [email or web address]. By joining [employer]’s FollowMyHealth portal, you will also be able to view your health information using other applications (apps) compatible with our system.
You can access the FollowMyHealth Patient Portal offered by [facility] by registering at the following website: [email or web address]. By joining [employer]’s FollowMyHealth portal, you will also be able to view your health information using other applications (apps) compatible with our system.

## 2021-08-24 NOTE — PROGRESS NOTE ADULT - ASSESSMENT
76YOF with history of non-insulin dependent type 2 diabetes (a1c 6.6% this admission), essential hypertension, GERD, PAD, on Eliquis, admitted for infected diabetic foot ulcer of LLE.    Diabetic foot infection  LLE diabetic ulcer  PAD  Persistent ulcer of LLE, now infected. Currently being tx with systemic abx   -on bactrim/cipro for now - total 7 days abx  -wound care per vascular  -continue statin    Recent fall  Patient reports fall at home last week, no injuries, has been unsteady on feet.  - PT rec KAREN, patient now declining, will go home with HPT    Non-insulin dependent type 2 diabetes  Hgb a1c 6.6% this admission. Takes metformin 500mg bid, Januvia 50mg qAM at home  -FSBG qac/qhs with SSI    On Eliquis  Unclear indication; unable to obtain from pharmacy and patient, primary team unable to reach outpatient provider. Continue for now    Essential hypertension - continue home losartan 25mg once daily  GERD - continue home PPI    Dispo: declining KAREN, plan d/c home today per primary team with HHPT - continue prior home meds, new rx for Bactrim/cipro total 7 days of antibiotics

## 2021-08-24 NOTE — PROGRESS NOTE ADULT - REASON FOR ADMISSION
Infected venous ulcer

## 2021-08-24 NOTE — PROGRESS NOTE ADULT - NUTRITIONAL ASSESSMENT
This patient has been assessed with a concern for Malnutrition and has been determined to have a diagnosis/diagnoses of Moderate protein-calorie malnutrition.    This patient is being managed with:   Diet Regular-  Entered: Aug 18 2021  7:07PM    
76 F, PMH HTN, DM, PVD with non healing venous ulcer in LLE who presents to  ED on 8/18 due to infected venous ulcer of the LLE     Plan   #venous ulcer of LLE   - wet to dry dressing changes w/ acetic acid bid   - pain control   - f/u AM labs  - clarify reason patient is on eliquis, will call pharmacy today to confirm   - increase gabapentin dose to TID due to +TTP during dressing change this AM   - VTE: no sqh since pt is on eliquis  - diet: regular  - ivf: none  - diabetic regimen: iss

## 2021-08-24 NOTE — PROGRESS NOTE ADULT - SUBJECTIVE AND OBJECTIVE BOX
Subjective/Interval events: No acute overnight events. No new issues this morning, tolerating diet. Declining KAREN for me this morning.    MEDICATIONS  (STANDING):  apixaban 5 milliGRAM(s) Oral two times a day  atorvastatin 20 milliGRAM(s) Oral at bedtime  bisacodyl Suppository 10 milliGRAM(s) Rectal once  ciprofloxacin     Tablet 500 milliGRAM(s) Oral every 12 hours  dextrose 40% Gel 15 Gram(s) Oral once  dextrose 5%. 1000 milliLiter(s) (50 mL/Hr) IV Continuous <Continuous>  dextrose 50% Injectable 25 Gram(s) IV Push once  gabapentin 300 milliGRAM(s) Oral three times a day  glucagon  Injectable 1 milliGRAM(s) IntraMuscular once  insulin lispro (ADMELOG) corrective regimen sliding scale   SubCutaneous Before meals and at bedtime  losartan 25 milliGRAM(s) Oral daily  pantoprazole    Tablet 40 milliGRAM(s) Oral before breakfast  polyethylene glycol 3350 17 Gram(s) Oral daily  trimethoprim  160 mG/sulfamethoxazole 800 mG 2 Tablet(s) Oral two times a day    MEDICATIONS  (PRN):  acetaminophen   Tablet .. 650 milliGRAM(s) Oral every 6 hours PRN Mild Pain (1 - 3)  bisacodyl 5 milliGRAM(s) Oral every 12 hours PRN Constipation  morphine  - Injectable 1 milliGRAM(s) IV Push every 4 hours PRN Severe Pain (7 - 10)  oxyCODONE    IR 5 milliGRAM(s) Oral every 4 hours PRN Moderate Pain (4 - 6)  senna 2 Tablet(s) Oral at bedtime PRN For Constipation      Vital Signs Last 24 Hrs  T(C): 36.5 (24 Aug 2021 08:51), Max: 36.5 (24 Aug 2021 06:13)  T(F): 97.7 (24 Aug 2021 08:51), Max: 97.7 (24 Aug 2021 06:13)  HR: 92 (24 Aug 2021 08:30) (81 - 107)  BP: 138/78 (24 Aug 2021 08:30) (117/76 - 156/67)  BP(mean): --  RR: 18 (24 Aug 2021 08:30) (17 - 18)  SpO2: 98% (24 Aug 2021 08:30) (95% - 98%)    PHYSICAL EXAM:  GENERAL: pleasant, appropriate, no acute distress. Participating appropriately in interview  HEENT - NC/AT, EOMI, PERLLA, oropharynx clear without exudate or erythema, moist mucus membranes  NECK: Soft, supple, No JVD, no lymphadenopathy  CHEST/LUNG: Clear to auscultation bilaterally; No rales, rhonchi, wheezing. Normal work of breathing, not tachypneic  HEART: Regular rate and rhythm; No murmurs, rubs, or gallops, normal s1/s2. Warm and well-perfused  ABDOMEN: Soft, Nontender, Nondistended. Normoactive bowel sounds.  EXTREMITIES:  2+ Peripheral Pulses, No clubbing, cyanosis, or edema  NEURO:  awake, alert, oriented to person, place, time, and situation. Cranial nerves intact, no motor or sensory deficits. Moves all extremities.  SKIN: LLE wrapped in ACE, c/d/i    LABS:  reviewed, none new    RADIOLOGY & ADDITIONAL TESTS:  reviewed, none new

## 2021-08-30 ENCOUNTER — APPOINTMENT (OUTPATIENT)
Dept: VASCULAR SURGERY | Facility: CLINIC | Age: 76
End: 2021-08-30

## 2021-09-03 ENCOUNTER — APPOINTMENT (OUTPATIENT)
Dept: VASCULAR SURGERY | Facility: CLINIC | Age: 76
End: 2021-09-03

## 2021-09-07 DIAGNOSIS — K21.9 GASTRO-ESOPHAGEAL REFLUX DISEASE WITHOUT ESOPHAGITIS: ICD-10-CM

## 2021-09-07 DIAGNOSIS — I87.2 VENOUS INSUFFICIENCY (CHRONIC) (PERIPHERAL): ICD-10-CM

## 2021-09-07 DIAGNOSIS — E11.51 TYPE 2 DIABETES MELLITUS WITH DIABETIC PERIPHERAL ANGIOPATHY WITHOUT GANGRENE: ICD-10-CM

## 2021-09-07 DIAGNOSIS — Z79.84 LONG TERM (CURRENT) USE OF ORAL HYPOGLYCEMIC DRUGS: ICD-10-CM

## 2021-09-07 DIAGNOSIS — I73.9 PERIPHERAL VASCULAR DISEASE, UNSPECIFIED: ICD-10-CM

## 2021-09-07 DIAGNOSIS — E11.622 TYPE 2 DIABETES MELLITUS WITH OTHER SKIN ULCER: ICD-10-CM

## 2021-09-07 DIAGNOSIS — E44.0 MODERATE PROTEIN-CALORIE MALNUTRITION: ICD-10-CM

## 2021-09-07 DIAGNOSIS — Z88.1 ALLERGY STATUS TO OTHER ANTIBIOTIC AGENTS STATUS: ICD-10-CM

## 2021-09-07 DIAGNOSIS — Z88.0 ALLERGY STATUS TO PENICILLIN: ICD-10-CM

## 2021-09-07 DIAGNOSIS — E11.621 TYPE 2 DIABETES MELLITUS WITH FOOT ULCER: ICD-10-CM

## 2021-09-07 DIAGNOSIS — L97.529 NON-PRESSURE CHRONIC ULCER OF OTHER PART OF LEFT FOOT WITH UNSPECIFIED SEVERITY: ICD-10-CM

## 2021-09-07 DIAGNOSIS — I10 ESSENTIAL (PRIMARY) HYPERTENSION: ICD-10-CM

## 2021-09-07 DIAGNOSIS — Z79.01 LONG TERM (CURRENT) USE OF ANTICOAGULANTS: ICD-10-CM

## 2025-06-11 NOTE — ED ADULT NURSE NOTE - NSIMPLEMENTINTERV_GEN_ALL_ED
- Wound care consulted while in acute care  - Left Pretibial leg wound: cleanse left lower leg wound with soap and water, pat dry, apply xeroform to wound bed and cover with foam dressing. Change every other day.    Implemented All Universal Safety Interventions:  [place] to call system. Call bell, personal items and telephone within reach. Instruct patient to call for assistance. Room bathroom lighting operational. Non-slip footwear when patient is off stretcher. Physically safe environment: no spills, clutter or unnecessary equipment. Stretcher in lowest position, wheels locked, appropriate side rails in place.